# Patient Record
Sex: FEMALE | Race: WHITE | NOT HISPANIC OR LATINO | Employment: OTHER | URBAN - METROPOLITAN AREA
[De-identification: names, ages, dates, MRNs, and addresses within clinical notes are randomized per-mention and may not be internally consistent; named-entity substitution may affect disease eponyms.]

---

## 2017-04-17 ENCOUNTER — OFFICE VISIT - HEALTHEAST (OUTPATIENT)
Dept: INTERNAL MEDICINE | Facility: CLINIC | Age: 67
End: 2017-04-17

## 2017-04-17 DIAGNOSIS — J32.9 SINUSITIS: ICD-10-CM

## 2017-04-17 DIAGNOSIS — L71.9 ROSACEA: ICD-10-CM

## 2017-04-17 DIAGNOSIS — F33.0 MAJOR DEPRESSIVE DISORDER, RECURRENT EPISODE, MILD (H): ICD-10-CM

## 2017-04-17 DIAGNOSIS — J06.9 URI (UPPER RESPIRATORY INFECTION): ICD-10-CM

## 2017-04-17 ASSESSMENT — MIFFLIN-ST. JEOR: SCORE: 1247.77

## 2017-04-18 ENCOUNTER — COMMUNICATION - HEALTHEAST (OUTPATIENT)
Dept: SCHEDULING | Facility: CLINIC | Age: 67
End: 2017-04-18

## 2017-04-19 ENCOUNTER — COMMUNICATION - HEALTHEAST (OUTPATIENT)
Dept: SCHEDULING | Facility: CLINIC | Age: 67
End: 2017-04-19

## 2017-04-21 ENCOUNTER — COMMUNICATION - HEALTHEAST (OUTPATIENT)
Dept: SCHEDULING | Facility: CLINIC | Age: 67
End: 2017-04-21

## 2017-04-21 DIAGNOSIS — B99.9 INFECTION: ICD-10-CM

## 2017-04-30 ENCOUNTER — COMMUNICATION - HEALTHEAST (OUTPATIENT)
Dept: INTERNAL MEDICINE | Facility: CLINIC | Age: 67
End: 2017-04-30

## 2017-05-03 ENCOUNTER — COMMUNICATION - HEALTHEAST (OUTPATIENT)
Dept: SCHEDULING | Facility: CLINIC | Age: 67
End: 2017-05-03

## 2017-05-03 ENCOUNTER — COMMUNICATION - HEALTHEAST (OUTPATIENT)
Dept: INTERNAL MEDICINE | Facility: CLINIC | Age: 67
End: 2017-05-03

## 2017-05-03 DIAGNOSIS — B99.9 INFECTION: ICD-10-CM

## 2017-05-17 ENCOUNTER — RADIANT APPOINTMENT (OUTPATIENT)
Dept: MAMMOGRAPHY | Facility: CLINIC | Age: 67
End: 2017-05-17
Attending: INTERNAL MEDICINE

## 2017-05-17 ENCOUNTER — RECORDS - HEALTHEAST (OUTPATIENT)
Dept: ADMINISTRATIVE | Facility: OTHER | Age: 67
End: 2017-05-17

## 2017-05-17 DIAGNOSIS — Z80.3 FAMILY HISTORY OF BREAST CANCER: ICD-10-CM

## 2017-05-17 DIAGNOSIS — N60.19 FIBROCYSTIC BREAST CHANGES, UNSPECIFIED LATERALITY: ICD-10-CM

## 2017-05-18 ENCOUNTER — OFFICE VISIT - HEALTHEAST (OUTPATIENT)
Dept: INTERNAL MEDICINE | Facility: CLINIC | Age: 67
End: 2017-05-18

## 2017-05-18 ENCOUNTER — HOSPITAL ENCOUNTER (OUTPATIENT)
Dept: LAB | Age: 67
Setting detail: SPECIMEN
Discharge: HOME | End: 2017-05-18

## 2017-05-18 DIAGNOSIS — R09.81 SINUS CONGESTION: ICD-10-CM

## 2017-05-18 DIAGNOSIS — R13.10 PAIN WITH SWALLOWING: ICD-10-CM

## 2017-05-18 DIAGNOSIS — J02.9 SORE THROAT: ICD-10-CM

## 2017-05-18 ASSESSMENT — MIFFLIN-ST. JEOR: SCORE: 1266.76

## 2017-05-25 ENCOUNTER — COMMUNICATION - HEALTHEAST (OUTPATIENT)
Dept: INTERNAL MEDICINE | Facility: CLINIC | Age: 67
End: 2017-05-25

## 2017-06-01 ENCOUNTER — RECORDS - HEALTHEAST (OUTPATIENT)
Dept: ADMINISTRATIVE | Facility: OTHER | Age: 67
End: 2017-06-01

## 2017-07-13 ENCOUNTER — COMMUNICATION - HEALTHEAST (OUTPATIENT)
Dept: INTERNAL MEDICINE | Facility: CLINIC | Age: 67
End: 2017-07-13

## 2017-08-26 ENCOUNTER — HEALTH MAINTENANCE LETTER (OUTPATIENT)
Age: 67
End: 2017-08-26

## 2017-12-16 ENCOUNTER — COMMUNICATION - HEALTHEAST (OUTPATIENT)
Dept: INTERNAL MEDICINE | Facility: CLINIC | Age: 67
End: 2017-12-16

## 2017-12-17 ENCOUNTER — COMMUNICATION - HEALTHEAST (OUTPATIENT)
Dept: INTERNAL MEDICINE | Facility: CLINIC | Age: 67
End: 2017-12-17

## 2017-12-18 ENCOUNTER — COMMUNICATION - HEALTHEAST (OUTPATIENT)
Dept: INTERNAL MEDICINE | Facility: CLINIC | Age: 67
End: 2017-12-18

## 2017-12-25 ENCOUNTER — COMMUNICATION - HEALTHEAST (OUTPATIENT)
Dept: INTERNAL MEDICINE | Facility: CLINIC | Age: 67
End: 2017-12-25

## 2017-12-26 ENCOUNTER — RECORDS - HEALTHEAST (OUTPATIENT)
Dept: ADMINISTRATIVE | Facility: OTHER | Age: 67
End: 2017-12-26

## 2017-12-26 ENCOUNTER — COMMUNICATION - HEALTHEAST (OUTPATIENT)
Dept: INTERNAL MEDICINE | Facility: CLINIC | Age: 67
End: 2017-12-26

## 2018-03-01 ENCOUNTER — HOSPITAL ENCOUNTER (OUTPATIENT)
Dept: LAB | Age: 68
Setting detail: SPECIMEN
Discharge: HOME | End: 2018-03-01

## 2018-03-01 ENCOUNTER — OFFICE VISIT - HEALTHEAST (OUTPATIENT)
Dept: INTERNAL MEDICINE | Facility: CLINIC | Age: 68
End: 2018-03-01

## 2018-03-01 ENCOUNTER — OFFICE VISIT - HEALTHEAST (OUTPATIENT)
Dept: PODIATRY | Facility: CLINIC | Age: 68
End: 2018-03-01

## 2018-03-01 DIAGNOSIS — L60.2 ONYCHAUXIS: ICD-10-CM

## 2018-03-01 DIAGNOSIS — R63.5 WEIGHT GAIN: ICD-10-CM

## 2018-03-01 DIAGNOSIS — L71.9 ROSACEA: ICD-10-CM

## 2018-03-01 DIAGNOSIS — Z23 NEED FOR VACCINATION: ICD-10-CM

## 2018-03-01 DIAGNOSIS — E55.9 VITAMIN D DEFICIENCY: ICD-10-CM

## 2018-03-01 DIAGNOSIS — F33.0 MAJOR DEPRESSIVE DISORDER, RECURRENT EPISODE, MILD (H): ICD-10-CM

## 2018-03-01 LAB — TSH SERPL DL<=0.005 MIU/L-ACNC: 1.85 UIU/ML (ref 0.3–5)

## 2018-03-02 LAB — 25(OH)D3 SERPL-MCNC: 35.3 NG/ML (ref 30–80)

## 2018-03-04 ENCOUNTER — COMMUNICATION - HEALTHEAST (OUTPATIENT)
Dept: INTERNAL MEDICINE | Facility: CLINIC | Age: 68
End: 2018-03-04

## 2018-03-06 ENCOUNTER — RECORDS - HEALTHEAST (OUTPATIENT)
Dept: ADMINISTRATIVE | Facility: OTHER | Age: 68
End: 2018-03-06

## 2018-05-14 ENCOUNTER — OFFICE VISIT - HEALTHEAST (OUTPATIENT)
Dept: INTERNAL MEDICINE | Facility: CLINIC | Age: 68
End: 2018-05-14

## 2018-05-14 DIAGNOSIS — Z12.31 ENCOUNTER FOR SCREENING MAMMOGRAM FOR BREAST CANCER: ICD-10-CM

## 2018-05-14 DIAGNOSIS — Z80.3 FAMILY HISTORY OF BREAST CANCER: ICD-10-CM

## 2018-05-14 DIAGNOSIS — S99.929A INJURY OF NAIL BED OF TOE: ICD-10-CM

## 2018-05-14 ASSESSMENT — MIFFLIN-ST. JEOR: SCORE: 1320.06

## 2018-05-23 ENCOUNTER — OFFICE VISIT (OUTPATIENT)
Dept: PODIATRY | Facility: CLINIC | Age: 68
End: 2018-05-23
Payer: MEDICARE

## 2018-05-23 ENCOUNTER — RECORDS - HEALTHEAST (OUTPATIENT)
Dept: ADMINISTRATIVE | Facility: OTHER | Age: 68
End: 2018-05-23

## 2018-05-23 VITALS
SYSTOLIC BLOOD PRESSURE: 136 MMHG | HEART RATE: 85 BPM | OXYGEN SATURATION: 97 % | BODY MASS INDEX: 26.37 KG/M2 | DIASTOLIC BLOOD PRESSURE: 82 MMHG | WEIGHT: 168 LBS | HEIGHT: 67 IN

## 2018-05-23 DIAGNOSIS — L60.2 LONG TOENAIL: Primary | ICD-10-CM

## 2018-05-23 PROCEDURE — 99202 OFFICE O/P NEW SF 15 MIN: CPT | Performed by: PODIATRIST

## 2018-05-23 ASSESSMENT — PAIN SCALES - GENERAL: PAINLEVEL: NO PAIN (0)

## 2018-05-23 NOTE — MR AVS SNAPSHOT
"              After Visit Summary   5/23/2018    Verónica Horn    MRN: [id number]           Patient Information     Date Of Birth          1950        Visit Information        Provider Department      5/23/2018 12:40 PM Fermin Alvarez DPM [facility]        Today's Diagnoses     Long toenail    -  1       Follow-ups after your visit        Your next 10 appointments already scheduled     May 25, 2018 11:00 AM CDT   MA SCREENING BILATERAL W/ KAITLIN with [id number]   [facility] ([facility])    [address]  2nd Floor  [place] [place] [facility] [address] [phone]           Three-dimensional (3D) mammograms are available at [place] locations in [facility], [place], [facility], [place], [facility], and [place]. [facility] locations include [place] and [facility] in [place]. Benefits of 3D mammograms include: - Improved rate of cancer detection - Decreases your chance of having to go back for more tests, which means fewer: - \"False-positive\" results (This means that there is an abnormal area but it isn't cancer.) - Invasive testing procedures, such as a biopsy or surgery - Can provide clearer images of the breast if you have dense breast tissue. 3D mammography is an optional exam that anyone can have with a 2D mammogram. It doesn't replace or take the place of a 2D mammogram. 2D mammograms remain an effective screening test for all women.  Not all insurance companies cover the cost of a 3D mammogram. Check with your insurance.              Who to contact     If you have questions or need follow up information about today's clinic visit or your schedule please contact [facility] directly at [phone].  Normal or non-critical lab and imaging results will be communicated to you by MyChart, letter or phone within 4 business days after the clinic has received the results. If you do not " "hear from us within 7 days, please contact the clinic through [email or web address] or phone. If you have a critical or abnormal lab result, we will notify you by phone as soon as possible.  Submit refill requests through [email or web address] or call your pharmacy and they will forward the refill request to us. Please allow 3 business days for your refill to be completed.          Additional Information About Your Visit        [employer] Information     [email or web address] is an electronic gateway that provides easy, online access to your medical records. With [email or web address], you can request a clinic appointment, read your test results, renew a prescription or communicate with your care team.     To sign up for [email or web address] visit the website at [email or web address]   You will be asked to enter the access code listed below, as well as some personal information. Please follow the directions to create your username and password.     Your access code is: [email or web address]  Expires: 2018  6:30 AM     Your access code will  in 90 days. If you need help or a new code, please contact your [facility] or call [phone] for assistance.        Care EveryWhere ID     This is your Care EveryWhere ID. This could be used by other organizations to access your [place] medical records  [id number]        Your Vitals Were     Pulse Height Pulse Oximetry BMI (Body Mass Index)          85 1.689 m (5' 6.5\") 97% 26.71 kg/m2         Blood Pressure from Last 3 Encounters:   18 136/82   07 108/68   05 94/64    Weight from Last 3 Encounters:   18 76.2 kg (168 lb)   07 69.4 kg (153 lb)   05 67.6 kg (149 lb)              Today, you had the following     No orders found for display       Primary Care Provider Office Phone # Fax #    Angelina Evans [phone]       [facility] [address] [facility] [address]        Equal Access to Services     MIKE ENCARNACION AH: Hadii aad ku " heber Moise, wabraedenda luqadaha, qatavonta kakaleighda linda, beth meimarga nicolette. So [facility] [phone].    ATENCIÓN: Si habla español, tiene a adams disposición servicios gratuitos de asistencia lingüística. Teja al [phone].    We comply with applicable federal civil rights laws and [place] laws. We do not discriminate on the basis of race, color, national origin, age, disability, sex, sexual orientation, or gender identity.            Thank you!     Thank you for choosing [facility]  for your care. Our goal is always to provide you with excellent care. Hearing back from our patients is one way we can continue to improve our services. Please take a few minutes to complete the written survey that you may receive in the mail after your visit with us. Thank you!             Your Updated Medication List - Protect others around you: Learn how to safely use, store and throw away your medicines at www.disposemymeds.org.          This list is accurate as of 5/23/18 11:59 PM.  Always use your most recent med list.                   Brand Name Dispense Instructions for use Diagnosis    BONIVA 150 MG tablet   Generic drug:  IBANdronate     .9    1 TABLET MONTHLY    Routine gynecological examination, Disorder of bone and cartilage, unspecified       ALEYDA-MAG PO      2 tablets daily        KLONOPIN PO      PRN        MULTIVITAMIN PO      1 tablet

## 2018-05-23 NOTE — NURSING NOTE
"Verónica Horn's chief complaint for this visit includes:  Chief Complaint   Patient presents with     Consult     Left 1st toe pain     PCP: Angelina Evans    Referring Provider:  Referred Self, MD  No address on file    /82  Pulse 85  Ht 1.689 m (5' 6.5\")  Wt 76.2 kg (168 lb)  SpO2 97%  BMI 26.71 kg/m2 No Pain (0)     Do you need any medication refills at today's visit? no      "
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
## 2018-05-25 ENCOUNTER — RECORDS - HEALTHEAST (OUTPATIENT)
Dept: ADMINISTRATIVE | Facility: OTHER | Age: 68
End: 2018-05-25

## 2018-05-25 ENCOUNTER — RADIANT APPOINTMENT (OUTPATIENT)
Dept: MAMMOGRAPHY | Facility: CLINIC | Age: 68
End: 2018-05-25
Payer: MEDICARE

## 2018-05-25 DIAGNOSIS — Z12.31 VISIT FOR SCREENING MAMMOGRAM: ICD-10-CM

## 2018-05-31 ENCOUNTER — MEDICAL CORRESPONDENCE (OUTPATIENT)
Dept: HEALTH INFORMATION MANAGEMENT | Facility: CLINIC | Age: 68
End: 2018-05-31

## 2018-05-31 ENCOUNTER — COMMUNICATION - HEALTHEAST (OUTPATIENT)
Dept: INTERNAL MEDICINE | Facility: CLINIC | Age: 68
End: 2018-05-31

## 2018-06-06 ENCOUNTER — COMMUNICATION - HEALTHEAST (OUTPATIENT)
Dept: INTERNAL MEDICINE | Facility: CLINIC | Age: 68
End: 2018-06-06

## 2018-06-08 ENCOUNTER — RECORDS - HEALTHEAST (OUTPATIENT)
Dept: ADMINISTRATIVE | Facility: OTHER | Age: 68
End: 2018-06-08

## 2018-06-08 ENCOUNTER — RADIANT APPOINTMENT (OUTPATIENT)
Dept: MAMMOGRAPHY | Facility: CLINIC | Age: 68
End: 2018-06-08
Attending: INTERNAL MEDICINE
Payer: MEDICARE

## 2018-06-08 DIAGNOSIS — R92.8 ABNORMAL MAMMOGRAM OF RIGHT BREAST: ICD-10-CM

## 2018-07-05 ENCOUNTER — COMMUNICATION - HEALTHEAST (OUTPATIENT)
Dept: INTERNAL MEDICINE | Facility: CLINIC | Age: 68
End: 2018-07-05

## 2018-07-05 DIAGNOSIS — R92.8 ABNORMAL MAMMOGRAM: ICD-10-CM

## 2018-08-28 ENCOUNTER — OFFICE VISIT - HEALTHEAST (OUTPATIENT)
Dept: INTERNAL MEDICINE | Facility: CLINIC | Age: 68
End: 2018-08-28

## 2018-08-28 ENCOUNTER — COMMUNICATION - HEALTHEAST (OUTPATIENT)
Dept: SCHEDULING | Facility: CLINIC | Age: 68
End: 2018-08-28

## 2018-08-28 ENCOUNTER — COMMUNICATION - HEALTHEAST (OUTPATIENT)
Dept: INTERNAL MEDICINE | Facility: CLINIC | Age: 68
End: 2018-08-28

## 2018-08-28 DIAGNOSIS — R07.89 CHEST WALL PAIN: ICD-10-CM

## 2018-08-29 ENCOUNTER — OFFICE VISIT - HEALTHEAST (OUTPATIENT)
Dept: INTERNAL MEDICINE | Facility: CLINIC | Age: 68
End: 2018-08-29

## 2018-08-29 DIAGNOSIS — M54.2 CERVICALGIA: ICD-10-CM

## 2018-08-29 DIAGNOSIS — M54.6 ACUTE LEFT-SIDED THORACIC BACK PAIN: ICD-10-CM

## 2018-10-04 ENCOUNTER — COMMUNICATION - HEALTHEAST (OUTPATIENT)
Dept: INTERNAL MEDICINE | Facility: CLINIC | Age: 68
End: 2018-10-04

## 2018-10-23 ENCOUNTER — COMMUNICATION - HEALTHEAST (OUTPATIENT)
Dept: SCHEDULING | Facility: CLINIC | Age: 68
End: 2018-10-23

## 2018-10-25 ENCOUNTER — OFFICE VISIT - HEALTHEAST (OUTPATIENT)
Dept: INTERNAL MEDICINE | Facility: CLINIC | Age: 68
End: 2018-10-25

## 2018-10-25 DIAGNOSIS — M99.02 SOMATIC DYSFUNCTION OF SPINE, THORACIC: ICD-10-CM
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
## 2018-10-25 DIAGNOSIS — M99.01 SOMATIC DYSFUNCTION OF CERVICAL REGION: ICD-10-CM

## 2018-10-25 DIAGNOSIS — M99.07 SOMATIC DYSFUNCTION OF SPINE AFFECTING UPPER EXTREMITY: ICD-10-CM

## 2018-10-25 ASSESSMENT — MIFFLIN-ST. JEOR: SCORE: 1326.01

## 2018-11-15 ENCOUNTER — OFFICE VISIT - HEALTHEAST (OUTPATIENT)
Dept: INTERNAL MEDICINE | Facility: CLINIC | Age: 68
End: 2018-11-15
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
## 2018-11-15 DIAGNOSIS — G43.809 OTHER MIGRAINE WITHOUT STATUS MIGRAINOSUS, NOT INTRACTABLE: ICD-10-CM

## 2018-11-15 ASSESSMENT — MIFFLIN-ST. JEOR: SCORE: 1318.76

## 2018-11-20 ENCOUNTER — COMMUNICATION - HEALTHEAST (OUTPATIENT)
Dept: INTERNAL MEDICINE | Facility: CLINIC | Age: 68
End: 2018-11-20

## 2018-11-20 DIAGNOSIS — M25.531 PAIN IN BOTH WRISTS: ICD-10-CM

## 2018-11-20 DIAGNOSIS — M25.532 PAIN IN BOTH WRISTS: ICD-10-CM

## 2018-11-20 DIAGNOSIS — M19.049 HAND ARTHRITIS: ICD-10-CM

## 2018-11-27 ENCOUNTER — COMMUNICATION - HEALTHEAST (OUTPATIENT)
Dept: INTERNAL MEDICINE | Facility: CLINIC | Age: 68
End: 2018-11-27

## 2018-11-27 DIAGNOSIS — L71.9 ROSACEA: ICD-10-CM

## 2019-06-26 ENCOUNTER — OFFICE VISIT - HEALTHEAST (OUTPATIENT)
Dept: INTERNAL MEDICINE | Facility: CLINIC | Age: 69
End: 2019-06-26

## 2019-06-26 DIAGNOSIS — E78.5 HYPERLIPIDEMIA LDL GOAL <130: ICD-10-CM

## 2019-06-26 DIAGNOSIS — N64.89 OCCLUSION OF BREAST DUCT: ICD-10-CM

## 2019-06-26 DIAGNOSIS — R53.83 FATIGUE, UNSPECIFIED TYPE: ICD-10-CM

## 2019-06-26 DIAGNOSIS — Z00.00 ROUTINE GENERAL MEDICAL EXAMINATION AT A HEALTH CARE FACILITY: ICD-10-CM

## 2019-06-26 DIAGNOSIS — Z12.31 ENCOUNTER FOR SCREENING MAMMOGRAM FOR BREAST CANCER: ICD-10-CM

## 2019-06-26 DIAGNOSIS — Z78.0 POST-MENOPAUSAL: ICD-10-CM

## 2019-06-26 DIAGNOSIS — M81.0 SENILE OSTEOPOROSIS: ICD-10-CM

## 2019-06-26 ASSESSMENT — MIFFLIN-ST. JEOR: SCORE: 1304.17

## 2019-06-27 ENCOUNTER — COMMUNICATION - HEALTHEAST (OUTPATIENT)
Dept: INTERNAL MEDICINE | Facility: CLINIC | Age: 69
End: 2019-06-27

## 2019-06-27 DIAGNOSIS — K21.9 GASTROESOPHAGEAL REFLUX DISEASE WITHOUT ESOPHAGITIS: ICD-10-CM

## 2019-06-28 ENCOUNTER — MEDICAL CORRESPONDENCE (OUTPATIENT)
Dept: HEALTH INFORMATION MANAGEMENT | Facility: CLINIC | Age: 69
End: 2019-06-28

## 2019-07-05 ENCOUNTER — ANCILLARY PROCEDURE (OUTPATIENT)
Dept: MAMMOGRAPHY | Facility: CLINIC | Age: 69
End: 2019-07-05
Attending: INTERNAL MEDICINE
Payer: MEDICARE

## 2019-07-05 DIAGNOSIS — N64.4 BREAST PAIN, RIGHT: ICD-10-CM

## 2019-07-08 ENCOUNTER — COMMUNICATION - HEALTHEAST (OUTPATIENT)
Dept: INTERNAL MEDICINE | Facility: CLINIC | Age: 69
End: 2019-07-08

## 2019-07-08 DIAGNOSIS — L98.9 SKIN LESION: ICD-10-CM

## 2019-07-10 ENCOUNTER — COMMUNICATION - HEALTHEAST (OUTPATIENT)
Dept: INTERNAL MEDICINE | Facility: CLINIC | Age: 69
End: 2019-07-10

## 2019-07-10 DIAGNOSIS — N61.1 BREAST ABSCESS: ICD-10-CM

## 2019-07-12 ENCOUNTER — RECORDS - HEALTHEAST (OUTPATIENT)
Dept: RADIOLOGY | Facility: CLINIC | Age: 69
End: 2019-07-12

## 2019-07-12 ENCOUNTER — RECORDS - HEALTHEAST (OUTPATIENT)
Dept: ADMINISTRATIVE | Facility: OTHER | Age: 69
End: 2019-07-12

## 2019-07-15 ENCOUNTER — HOSPITAL ENCOUNTER (OUTPATIENT)
Dept: SURGERY | Facility: CLINIC | Age: 69
Discharge: HOME | End: 2019-07-15
Attending: INTERNAL MEDICINE | Admitting: SURGERY

## 2019-07-15 ENCOUNTER — AMBULATORY - HEALTHEAST (OUTPATIENT)
Dept: ONCOLOGY | Facility: CLINIC | Age: 69
End: 2019-07-15

## 2019-07-15 DIAGNOSIS — Q83.9 NIPPLE ANOMALY: ICD-10-CM

## 2019-08-21 ENCOUNTER — RECORDS - HEALTHEAST (OUTPATIENT)
Dept: ADMINISTRATIVE | Facility: OTHER | Age: 69
End: 2019-08-21

## 2019-09-11 ENCOUNTER — RECORDS - HEALTHEAST (OUTPATIENT)
Dept: BONE DENSITY | Facility: CLINIC | Age: 69
End: 2019-09-11

## 2019-09-11 DIAGNOSIS — Z78.0 ASYMPTOMATIC MENOPAUSAL STATE: ICD-10-CM

## 2019-09-18 ENCOUNTER — COMMUNICATION - HEALTHEAST (OUTPATIENT)
Dept: INTERNAL MEDICINE | Facility: CLINIC | Age: 69
End: 2019-09-18

## 2019-09-18 DIAGNOSIS — M19.049 HAND ARTHRITIS: ICD-10-CM

## 2019-09-27 ENCOUNTER — RECORDS - HEALTHEAST (OUTPATIENT)
Dept: ADMINISTRATIVE | Facility: OTHER | Age: 69
End: 2019-09-27

## 2019-10-01 ENCOUNTER — COMMUNICATION - HEALTHEAST (OUTPATIENT)
Dept: INTERNAL MEDICINE | Facility: CLINIC | Age: 69
End: 2019-10-01

## 2019-12-11 ENCOUNTER — COMMUNICATION - HEALTHEAST (OUTPATIENT)
Dept: INTERNAL MEDICINE | Facility: CLINIC | Age: 69
End: 2019-12-11

## 2019-12-11 DIAGNOSIS — F51.01 PRIMARY INSOMNIA: ICD-10-CM

## 2019-12-20 ENCOUNTER — OFFICE VISIT - HEALTHEAST (OUTPATIENT)
Dept: INTERNAL MEDICINE | Facility: CLINIC | Age: 69
End: 2019-12-20

## 2019-12-20 DIAGNOSIS — M81.0 AGE-RELATED OSTEOPOROSIS WITHOUT CURRENT PATHOLOGICAL FRACTURE: ICD-10-CM

## 2019-12-20 DIAGNOSIS — M81.0 SENILE OSTEOPOROSIS: ICD-10-CM

## 2019-12-20 DIAGNOSIS — N64.89 OCCLUSION OF BREAST DUCT: ICD-10-CM

## 2019-12-20 DIAGNOSIS — R53.83 FATIGUE, UNSPECIFIED TYPE: ICD-10-CM

## 2019-12-20 DIAGNOSIS — E78.5 HYPERLIPIDEMIA LDL GOAL <130: ICD-10-CM

## 2019-12-20 LAB
ALBUMIN SERPL-MCNC: 3.9 G/DL (ref 3.5–5)
ALP SERPL-CCNC: 70 U/L (ref 45–120)
ALT SERPL W P-5'-P-CCNC: 20 U/L (ref 0–45)
ANION GAP SERPL CALCULATED.3IONS-SCNC: 10 MMOL/L (ref 5–18)
AST SERPL W P-5'-P-CCNC: 23 U/L (ref 0–40)
BILIRUB SERPL-MCNC: 0.5 MG/DL (ref 0–1)
BUN SERPL-MCNC: 18 MG/DL (ref 8–22)
CALCIUM SERPL-MCNC: 9.8 MG/DL (ref 8.5–10.5)
CHLORIDE BLD-SCNC: 107 MMOL/L (ref 98–107)
CHOLEST SERPL-MCNC: 272 MG/DL
CO2 SERPL-SCNC: 25 MMOL/L (ref 22–31)
CREAT SERPL-MCNC: 0.83 MG/DL (ref 0.6–1.1)
ERYTHROCYTE [DISTWIDTH] IN BLOOD BY AUTOMATED COUNT: 10.2 % (ref 11–14.5)
FASTING STATUS PATIENT QL REPORTED: NO
GFR SERPL CREATININE-BSD FRML MDRD: >60 ML/MIN/1.73M2
GLUCOSE BLD-MCNC: 99 MG/DL (ref 70–125)
HCT VFR BLD AUTO: 47.7 % (ref 35–47)
HDLC SERPL-MCNC: 77 MG/DL
HGB BLD-MCNC: 15.7 G/DL (ref 12–16)
LDLC SERPL CALC-MCNC: 155 MG/DL
MCH RBC QN AUTO: 33.6 PG (ref 27–34)
MCHC RBC AUTO-ENTMCNC: 32.9 G/DL (ref 32–36)
MCV RBC AUTO: 102 FL (ref 80–100)
PLATELET # BLD AUTO: 329 THOU/UL (ref 140–440)
PMV BLD AUTO: 6.7 FL (ref 7–10)
POTASSIUM BLD-SCNC: 4 MMOL/L (ref 3.5–5)
PROT SERPL-MCNC: 7.2 G/DL (ref 6–8)
PTH-INTACT SERPL-MCNC: 31 PG/ML (ref 10–86)
RBC # BLD AUTO: 4.67 MILL/UL (ref 3.8–5.4)
SODIUM SERPL-SCNC: 142 MMOL/L (ref 136–145)
TRIGL SERPL-MCNC: 202 MG/DL
TSH SERPL DL<=0.005 MIU/L-ACNC: 2.13 UIU/ML (ref 0.3–5)
WBC: 6.2 THOU/UL (ref 4–11)

## 2019-12-20 ASSESSMENT — MIFFLIN-ST. JEOR: SCORE: 1322.34

## 2019-12-22 LAB — 25(OH)D3 SERPL-MCNC: 37.6 NG/ML (ref 30–80)

## 2019-12-23 ENCOUNTER — COMMUNICATION - HEALTHEAST (OUTPATIENT)
Dept: INTERNAL MEDICINE | Facility: CLINIC | Age: 69
End: 2019-12-23

## 2019-12-23 LAB
GLIADIN IGA SER-ACNC: 1.2 U/ML
GLIADIN IGG SER-ACNC: <0.4 U/ML
IGA SERPL-MCNC: 257 MG/DL (ref 65–400)
TTG IGA SER-ACNC: 0.3 U/ML
TTG IGG SER-ACNC: <0.6 U/ML

## 2020-01-23 ENCOUNTER — COMMUNICATION - HEALTHEAST (OUTPATIENT)
Dept: SCHEDULING | Facility: CLINIC | Age: 70
End: 2020-01-23

## 2020-01-23 ENCOUNTER — OFFICE VISIT - HEALTHEAST (OUTPATIENT)
Dept: INTERNAL MEDICINE | Facility: CLINIC | Age: 70
End: 2020-01-23

## 2020-01-23 DIAGNOSIS — R09.81 SINUS CONGESTION: ICD-10-CM

## 2020-01-23 DIAGNOSIS — J40 BRONCHITIS: ICD-10-CM

## 2020-01-23 DIAGNOSIS — R05.9 COUGH: ICD-10-CM

## 2020-01-23 LAB
FLUAV AG SPEC QL IA: NORMAL
FLUBV AG SPEC QL IA: NORMAL

## 2020-04-04 ENCOUNTER — COMMUNICATION - HEALTHEAST (OUTPATIENT)
Dept: INTERNAL MEDICINE | Facility: CLINIC | Age: 70
End: 2020-04-04

## 2020-04-04 DIAGNOSIS — L71.9 ROSACEA: ICD-10-CM

## 2020-04-16 ENCOUNTER — COMMUNICATION - HEALTHEAST (OUTPATIENT)
Dept: INTERNAL MEDICINE | Facility: CLINIC | Age: 70
End: 2020-04-16

## 2020-11-11 ENCOUNTER — RECORDS - HEALTHEAST (OUTPATIENT)
Dept: ADMINISTRATIVE | Facility: OTHER | Age: 70
End: 2020-11-11

## 2020-11-19 ENCOUNTER — RECORDS - HEALTHEAST (OUTPATIENT)
Dept: ADMINISTRATIVE | Facility: OTHER | Age: 70
End: 2020-11-19

## 2020-12-04 ENCOUNTER — OFFICE VISIT - HEALTHEAST (OUTPATIENT)
Dept: INTERNAL MEDICINE | Facility: CLINIC | Age: 70
End: 2020-12-04

## 2020-12-04 DIAGNOSIS — Z00.00 ROUTINE GENERAL MEDICAL EXAMINATION AT A HEALTH CARE FACILITY: ICD-10-CM

## 2020-12-04 DIAGNOSIS — Z12.31 ENCOUNTER FOR SCREENING MAMMOGRAM FOR BREAST CANCER: ICD-10-CM

## 2020-12-04 DIAGNOSIS — E55.9 VITAMIN D DEFICIENCY: ICD-10-CM

## 2020-12-04 DIAGNOSIS — Z11.59 ENCOUNTER FOR HEPATITIS C SCREENING TEST FOR LOW RISK PATIENT: ICD-10-CM

## 2020-12-04 DIAGNOSIS — M81.0 AGE-RELATED OSTEOPOROSIS WITHOUT CURRENT PATHOLOGICAL FRACTURE: ICD-10-CM

## 2020-12-04 DIAGNOSIS — E78.5 HYPERLIPIDEMIA LDL GOAL <130: ICD-10-CM

## 2020-12-04 LAB
ATRIAL RATE - MUSE: 78 BPM
DIASTOLIC BLOOD PRESSURE - MUSE: NORMAL
INTERPRETATION ECG - MUSE: NORMAL
P AXIS - MUSE: 71 DEGREES
PR INTERVAL - MUSE: 148 MS
QRS DURATION - MUSE: 134 MS
QT - MUSE: 424 MS
QTC - MUSE: 483 MS
R AXIS - MUSE: 20 DEGREES
SYSTOLIC BLOOD PRESSURE - MUSE: NORMAL
T AXIS - MUSE: 200 DEGREES
VENTRICULAR RATE- MUSE: 78 BPM

## 2020-12-04 ASSESSMENT — PATIENT HEALTH QUESTIONNAIRE - PHQ9: SUM OF ALL RESPONSES TO PHQ QUESTIONS 1-9: 0

## 2020-12-04 ASSESSMENT — MIFFLIN-ST. JEOR: SCORE: 1354.09

## 2020-12-08 ENCOUNTER — COMMUNICATION - HEALTHEAST (OUTPATIENT)
Dept: INTERNAL MEDICINE | Facility: CLINIC | Age: 70
End: 2020-12-08

## 2020-12-08 DIAGNOSIS — F51.01 PRIMARY INSOMNIA: ICD-10-CM

## 2020-12-18 ENCOUNTER — AMBULATORY - HEALTHEAST (OUTPATIENT)
Dept: LAB | Facility: CLINIC | Age: 70
End: 2020-12-18

## 2020-12-18 DIAGNOSIS — E55.9 VITAMIN D DEFICIENCY: ICD-10-CM

## 2020-12-18 DIAGNOSIS — Z11.59 ENCOUNTER FOR HEPATITIS C SCREENING TEST FOR LOW RISK PATIENT: ICD-10-CM

## 2020-12-18 DIAGNOSIS — Z00.00 ROUTINE GENERAL MEDICAL EXAMINATION AT A HEALTH CARE FACILITY: ICD-10-CM

## 2020-12-18 DIAGNOSIS — E78.5 HYPERLIPIDEMIA LDL GOAL <130: ICD-10-CM

## 2020-12-18 LAB
ALBUMIN SERPL-MCNC: 4.3 G/DL (ref 3.5–5)
ALP SERPL-CCNC: 83 U/L (ref 45–120)
ALT SERPL W P-5'-P-CCNC: 17 U/L (ref 0–45)
ANION GAP SERPL CALCULATED.3IONS-SCNC: 13 MMOL/L (ref 5–18)
AST SERPL W P-5'-P-CCNC: 24 U/L (ref 0–40)
BASOPHILS # BLD AUTO: 0.1 THOU/UL (ref 0–0.2)
BASOPHILS NFR BLD AUTO: 1 % (ref 0–2)
BILIRUB SERPL-MCNC: 0.9 MG/DL (ref 0–1)
BUN SERPL-MCNC: 10 MG/DL (ref 8–22)
CALCIUM SERPL-MCNC: 9.4 MG/DL (ref 8.5–10.5)
CHLORIDE BLD-SCNC: 104 MMOL/L (ref 98–107)
CHOLEST SERPL-MCNC: 278 MG/DL
CO2 SERPL-SCNC: 24 MMOL/L (ref 22–31)
CREAT SERPL-MCNC: 0.86 MG/DL (ref 0.6–1.1)
EOSINOPHIL # BLD AUTO: 0.1 THOU/UL (ref 0–0.4)
EOSINOPHIL NFR BLD AUTO: 2 % (ref 0–6)
ERYTHROCYTE [DISTWIDTH] IN BLOOD BY AUTOMATED COUNT: 12.6 % (ref 11–14.5)
FASTING STATUS PATIENT QL REPORTED: YES
GFR SERPL CREATININE-BSD FRML MDRD: >60 ML/MIN/1.73M2
GLUCOSE BLD-MCNC: 103 MG/DL (ref 70–125)
HCT VFR BLD AUTO: 48.5 % (ref 35–47)
HDLC SERPL-MCNC: 76 MG/DL
HGB BLD-MCNC: 16.1 G/DL (ref 12–16)
IMM GRANULOCYTES # BLD: 0 THOU/UL
IMM GRANULOCYTES NFR BLD: 0 %
LDLC SERPL CALC-MCNC: 184 MG/DL
LYMPHOCYTES # BLD AUTO: 2 THOU/UL (ref 0.8–4.4)
LYMPHOCYTES NFR BLD AUTO: 35 % (ref 20–40)
MCH RBC QN AUTO: 32.5 PG (ref 27–34)
MCHC RBC AUTO-ENTMCNC: 33.2 G/DL (ref 32–36)
MCV RBC AUTO: 98 FL (ref 80–100)
MONOCYTES # BLD AUTO: 0.5 THOU/UL (ref 0–0.9)
MONOCYTES NFR BLD AUTO: 8 % (ref 2–10)
NEUTROPHILS # BLD AUTO: 3.1 THOU/UL (ref 2–7.7)
NEUTROPHILS NFR BLD AUTO: 54 % (ref 50–70)
PLATELET # BLD AUTO: 309 THOU/UL (ref 140–440)
PMV BLD AUTO: 9.4 FL (ref 8.5–12.5)
POTASSIUM BLD-SCNC: 4.1 MMOL/L (ref 3.5–5)
PROT SERPL-MCNC: 7.4 G/DL (ref 6–8)
RBC # BLD AUTO: 4.96 MILL/UL (ref 3.8–5.4)
SODIUM SERPL-SCNC: 141 MMOL/L (ref 136–145)
TRIGL SERPL-MCNC: 91 MG/DL
TSH SERPL DL<=0.005 MIU/L-ACNC: 4.06 UIU/ML (ref 0.3–5)
WBC: 5.7 THOU/UL (ref 4–11)

## 2020-12-21 LAB
25(OH)D3 SERPL-MCNC: 45.6 NG/ML (ref 30–80)
HCV AB SERPL QL IA: NEGATIVE

## 2020-12-22 ENCOUNTER — COMMUNICATION - HEALTHEAST (OUTPATIENT)
Dept: INTERNAL MEDICINE | Facility: CLINIC | Age: 70
End: 2020-12-22

## 2020-12-22 DIAGNOSIS — F51.01 PRIMARY INSOMNIA: ICD-10-CM

## 2021-01-04 ENCOUNTER — COMMUNICATION - HEALTHEAST (OUTPATIENT)
Dept: INTERNAL MEDICINE | Facility: CLINIC | Age: 71
End: 2021-01-04

## 2021-01-04 DIAGNOSIS — E78.5 HYPERLIPIDEMIA LDL GOAL <130: ICD-10-CM

## 2021-01-04 DIAGNOSIS — F51.01 PRIMARY INSOMNIA: ICD-10-CM

## 2021-01-14 ENCOUNTER — COMMUNICATION - HEALTHEAST (OUTPATIENT)
Dept: INTERNAL MEDICINE | Facility: CLINIC | Age: 71
End: 2021-01-14

## 2021-01-29 ENCOUNTER — COMMUNICATION - HEALTHEAST (OUTPATIENT)
Dept: INTERNAL MEDICINE | Facility: CLINIC | Age: 71
End: 2021-01-29

## 2021-02-09 ENCOUNTER — ANCILLARY PROCEDURE (OUTPATIENT)
Dept: MAMMOGRAPHY | Facility: CLINIC | Age: 71
End: 2021-02-09
Payer: COMMERCIAL

## 2021-02-09 DIAGNOSIS — Z12.31 VISIT FOR SCREENING MAMMOGRAM: ICD-10-CM

## 2021-02-09 PROCEDURE — 77067 SCR MAMMO BI INCL CAD: CPT | Mod: GC | Performed by: RADIOLOGY

## 2021-02-09 PROCEDURE — 77063 BREAST TOMOSYNTHESIS BI: CPT | Mod: GC | Performed by: RADIOLOGY

## 2021-02-14 ENCOUNTER — HEALTH MAINTENANCE LETTER (OUTPATIENT)
Age: 71
End: 2021-02-14

## 2021-02-19 ENCOUNTER — COMMUNICATION - HEALTHEAST (OUTPATIENT)
Dept: INTERNAL MEDICINE | Facility: CLINIC | Age: 71
End: 2021-02-19

## 2021-02-19 DIAGNOSIS — R09.81 SINUS CONGESTION: ICD-10-CM

## 2021-03-11 ENCOUNTER — OFFICE VISIT - HEALTHEAST (OUTPATIENT)
Dept: INTERNAL MEDICINE | Facility: CLINIC | Age: 71
End: 2021-03-11

## 2021-03-11 DIAGNOSIS — H01.001 BLEPHARITIS OF UPPER EYELIDS OF BOTH EYES, UNSPECIFIED TYPE: ICD-10-CM

## 2021-03-11 DIAGNOSIS — M81.0 AGE-RELATED OSTEOPOROSIS WITHOUT CURRENT PATHOLOGICAL FRACTURE: ICD-10-CM

## 2021-03-11 DIAGNOSIS — H01.004 BLEPHARITIS OF UPPER EYELIDS OF BOTH EYES, UNSPECIFIED TYPE: ICD-10-CM

## 2021-03-11 DIAGNOSIS — M79.645 PAIN OF FINGER OF LEFT HAND: ICD-10-CM

## 2021-05-07 ENCOUNTER — COMMUNICATION - HEALTHEAST (OUTPATIENT)
Dept: INTERNAL MEDICINE | Facility: CLINIC | Age: 71
End: 2021-05-07

## 2021-05-07 DIAGNOSIS — M81.0 SENILE OSTEOPOROSIS: ICD-10-CM

## 2021-05-26 ASSESSMENT — PATIENT HEALTH QUESTIONNAIRE - PHQ9: SUM OF ALL RESPONSES TO PHQ QUESTIONS 1-9: 0

## 2021-05-30 VITALS — HEIGHT: 67 IN | BODY MASS INDEX: 24.05 KG/M2 | WEIGHT: 153.25 LBS

## 2021-05-30 NOTE — PROGRESS NOTES
History:  This is a 69 y.o. female who I'm asked to see for evaluation of a right nipple lesion.  This was picked up by the patient about 2.5 weeks ago when she was in the shower.  It is on the tip of the nipple.  It has remained the same size since she noticed it.  It is slightly uncomfortable.  It has never drained.  She denies any erythema.  She is up-to-date on her breast imaging.    PAST MEDICAL HISTORY:  No past medical history on file.   Recently accidentally drank hydrogen peroxide.  Is taking Prilosec for this.  Fibromyalgia    PAST SURGICAL HISTORY:  Open cholecystectomy  Tonsillectomy    Current Outpatient Medications:      5-hydroxytryptophan 50 mg cap, Take 100 mg by mouth at bedtime. One tab in the morning and one tab in afternoon, Disp: , Rfl:      ammonium lactate (AMLACTIN) 12 % cream, Apply topically as needed for dry skin., Disp: 385 g, Rfl: 3     CALCIUM CARBONATE (CALCIUM 500 ORAL), Take 2,000 mg by mouth., Disp: , Rfl:      cholecalciferol, vitamin D3, (VITAMIN D3) 2,000 unit cap, Take by mouth., Disp: , Rfl:      fluticasone propionate (FLONASE) 50 mcg/actuation nasal spray, , Disp: , Rfl:      GREEN TEA LEAF EXTRACT (GREEN TEA ORAL), Take by mouth as needed. , Disp: , Rfl:      LOTEMAX 0.5 % DrpG, ADMINISTER 1 DROP TO BOTH EYES 2 (TWO) TIMES A DAY AS NEEDED., Disp: 5 g, Rfl: 0     metroNIDAZOLE (METROCREAM) 0.75 % cream, Apply twice a day, Disp: 45 g, Rfl: 3     omeprazole (PRILOSEC) 20 MG capsule, Take 1 capsule (20 mg total) by mouth daily before breakfast. Take daily for 3 weeksk, then start every other day for 1 week, then stop., Disp: 30 capsule, Rfl: 0     PROPYLENE GLYCOL/ (SYSTANE OPHT), Apply to eye as needed. OTC, Disp: , Rfl:      UNABLE TO FIND, Med Name: L-Theanine, Disp: , Rfl:     Allergies:  Allergies   Allergen Reactions     Cat Dander      Mite-Dermatophagoides Pteronyssinus, Standardized      Venom-Honey Bee        Social History:   reports that she has never smoked.  She has never used smokeless tobacco. She reports that she drinks alcohol. She reports that she does not use drugs.    Family History:  Mother had breast cancer in her 40s and  of it at 48.  Grandmother also had breast cancer.    ROS:  General: No complaints or constitutional symptoms  Skin: No complaints or symptoms   Hematologic/Lymphatic: No symptoms or complaints  Psychiatric: No symptoms or complaints  Endocrine: No excessive fatigue, no hypermetabolic symptoms reported  Respiratory: No cough, shortness of breath, or wheezing  Cardiovascular: No chest pain or dyspnea on exertion  Breast: Right nipple lesion.  Denies breast masses, nipple discharge, or nipple inversion.  Gastrointestinal: No abdominal pain, nausea, diarrhea, or constipation  Musculoskeletal: No recent injuries reported  Neurological: No focal neurologic defects reported.      PHYSICAL EXAM  There were no vitals taken for this visit.  General: Alert, cooperative, appears stated age   Skin: Skin color, texture, turgor normal, no rashes or lesions   Lymphatic: No obvious adenopathy, no swelling   Eyes: No scleral icterus, pupils equal  HENT: No traumatic injury to the head or face, no gross abnormalities  Lungs: Normal respiratory effort, breath sounds equal bilaterally  Heart: Regular rate and rhythm  Breasts: 2 mm white cyst at the 10 o'clock position within the nipple.  No palpable masses to either breast.  Abdomen: Soft, non-distended and non-tender to palpation  Neurologic: Grossly intact    IMAGING  Examinations: MA DIAGNOSTIC BILATERAL W/ KAITLIN, US BREAST RIGHT LIMITED  1-3 QUADRANTS, 2019 1:10 PM and CAD    Comparisons: 2018, 2018, 2017, 2011    History/Family History: Right nipple irritation. Family history for  breast cancer in mother at age 45; grandmother.    Breast Density: Scattered fibroglandular densities  Technique: Standard mammographic views were performed with  tomosynthesis and 2D  reconstruction.    Findings:     Mammogram:  There is been no significant interval change in either breast. There  is a biopsy marker in the right medial breast at middle depth.    Ultrasound:  Targeted, real-time ultrasound evaluation was performed by the  technologist and radiologist.  In the retroareolar region at 12:00, there is a stable cluster of  cysts. The patient indicates the area to be a white nodule at the tip  of her nipple. There is no abnormal blood flow of the nipple.    IMPRESSION: BI-RADS CATEGORY: 2 - Benign. The area of concern  corresponds to a skin lesion on the right nipple. No suspicious  mammographic or sonographic findings.    RECOMMENDED FOLLOW-UP: Annual Mammography. Clinical follow-up and  management of right nipple skin lesion. If it worsens, consultation  with dermatology can be considered.    PATHOLOGY  None    IMPRESSION:  Right nipple cyst.      PLAN:   Incision and drainage of this cyst was performed after consent.  The nipple areolar complex was prepped with Betadine.  1 cc of 1% lidocaine was injected into the nipple.  A 15 blade was then utilized to excise the epidermis and the underlying cyst with the sebaceous material.  Hemostasis was achieved with pressure and a Band-Aid was placed over the lesion.  The small lesion was not sent for pathology.    She can remove the Band-Aid later today.  Replace Band-Aid if there is any drainage at the site.  Return to the breast clinic as needed    Coby Cruz DO  [facility]  [phone]

## 2021-05-30 NOTE — TELEPHONE ENCOUNTER
Pt has small breast abscess that needs to be popped.Please help schedule appt for today or tomorrow with plastic surgery..  If non available with plastics, she could  see general surgeon ( Dr Cruz or her partner). I put referrals in.

## 2021-05-30 NOTE — TELEPHONE ENCOUNTER
"Who is calling:  The patient  Reason for Call:  The patient would like to have her referral for both the mammogram and the ultrasound faxed to the [facility]. The patient states that the phone number is [phone], she is unaware of the fax number. The patient states, \"This is mandatory.\"   Date of last appointment with primary care: 6/26/2019  Okay to leave a detailed message: Yes      "

## 2021-05-30 NOTE — TELEPHONE ENCOUNTER
Question following Office Visit  When did you see your provider: Yesterday  What is your question: She forgot to ask for the medication Dr. Evans talked about that would help heal the lining of her GI tract.  Please prescription to [facility].  Okay to leave a detailed message: No.  Patient is heard of hearing.

## 2021-05-30 NOTE — TELEPHONE ENCOUNTER
Who is calling:  Patient  Reason for Call:     Patient was told at [facility] ultrasound should follow-up with Dermatologist for the pimple in her right nipple.  Caller questioning if this can be taken care of in primary clinic, if not would like referral .    Patient's insurance denied payment for the PT patient received at [facility], for the rib cage pain. Inquiring as to how this order was put in, what diagnosis.  Needs clarification.  Date of last appointment with primary care: 06/26/19  Okay to leave a detailed message: No

## 2021-05-30 NOTE — TELEPHONE ENCOUNTER
Called and talked to pt and she is referring to for the [facility] bill for when Dr Dubose seen her on 8/29/18.  She is stating that she saw him for left breast and rib pain.  I did remind her that she also saw Dr Montoya as well and that is what she talked to her about. I advised her that Dr Dubose did order [facility] pt for acute left sided thoracic pain and back pain, postural strengening, neck and shoulder pain for land and aqua.    I offered to call [facility] for her to see what they order they got as well as what they actually did.  I called [facility] and they said gave me another phone number of [phone] and I had to leave a  for return phone call to discuss.

## 2021-05-30 NOTE — TELEPHONE ENCOUNTER
Per Derm - This patient is scheduled for August 21st at 1:50pm with Dr. Nerissa Manning at our [place] location. Thank you for the referral.
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
## 2021-05-30 NOTE — TELEPHONE ENCOUNTER
Patient Returning Call  Reason for call:  Message from clinic  Information relayed to patient: Angelina Evans MD         9:54 AM   Note      Pt has small breast abscess that needs to be popped.Please help schedule appt for today or tomorrow with plastic surgery..  If non available with plastics, she could  see general surgeon ( Dr Cruz or her partner). I put referrals in.       Patient has additional questions:  No  If YES, what are your questions/concerns:  Patient given phone number for Dr. Cruz  Okay to leave a detailed message?: No call back needed

## 2021-05-30 NOTE — TELEPHONE ENCOUNTER
Per MD requested, call patient and relay below message. Pt agreed with MD recommendation for plastic surgery.

## 2021-05-30 NOTE — TELEPHONE ENCOUNTER
Pt has appt with me on thursday for a breast pimple.   I did refer her to dermatology for this. Please ask her to see them. I do not do pimple/abscess drainage.

## 2021-05-30 NOTE — PROGRESS NOTES
"[facility] with Dr. Cruz.  Patient states she has had a white \"pimple\" on her right nipple for several weeks.  It has not changed at all in that time but does \"hurt\" at times.  Patient met with Dr. Cruz, see dictation for details and follow up plan for patient.    "
Clear bilaterally, pupils equal, round and reactive to light.

## 2021-05-31 VITALS — BODY MASS INDEX: 24.71 KG/M2 | HEIGHT: 67 IN | WEIGHT: 157.44 LBS

## 2021-06-01 VITALS — WEIGHT: 169.19 LBS | BODY MASS INDEX: 26.55 KG/M2 | HEIGHT: 67 IN

## 2021-06-01 VITALS — WEIGHT: 168.44 LBS | BODY MASS INDEX: 26.38 KG/M2

## 2021-06-01 NOTE — TELEPHONE ENCOUNTER
Referral Request  Type of referral: hand therapy with occupational therapist  Who s requesting: Patient  Why the request: For the last 4 years I have had a referral to [facility] with 1-2 visits per year for hand therapy.  I will be scheduling for hand surgery soon but need this therapy prior.    Have you been seen for this request: N/A Patient stated This is not needed  Does patient have a preference on a group/provider? Yes [facility] fax number is [phone] attn Hand Therapy Department    Okay to leave a detailed message?  Yes      Patient is requesting this to be done ASAP as she wants to be seen 9/27/19 on their earliest appointment. Patient also asks to have the wording on the referral to call patient ASAP to schedule this appointment once this referral is received.  The scheduling number for them is [phone].

## 2021-06-02 VITALS — BODY MASS INDEX: 26.76 KG/M2 | WEIGHT: 170.5 LBS | HEIGHT: 67 IN

## 2021-06-02 VITALS — WEIGHT: 171.56 LBS | BODY MASS INDEX: 27.28 KG/M2

## 2021-06-02 VITALS — WEIGHT: 168.9 LBS | HEIGHT: 67 IN | BODY MASS INDEX: 26.51 KG/M2

## 2021-06-03 VITALS — WEIGHT: 168 LBS | BODY MASS INDEX: 26.84 KG/M2

## 2021-06-03 VITALS — HEIGHT: 66 IN | WEIGHT: 168 LBS | BODY MASS INDEX: 27 KG/M2

## 2021-06-04 VITALS
WEIGHT: 168 LBS | OXYGEN SATURATION: 98 % | TEMPERATURE: 98.4 F | SYSTOLIC BLOOD PRESSURE: 126 MMHG | BODY MASS INDEX: 26.84 KG/M2 | DIASTOLIC BLOOD PRESSURE: 80 MMHG | HEART RATE: 80 BPM

## 2021-06-04 VITALS
DIASTOLIC BLOOD PRESSURE: 70 MMHG | SYSTOLIC BLOOD PRESSURE: 106 MMHG | HEART RATE: 72 BPM | WEIGHT: 172 LBS | HEIGHT: 66 IN | BODY MASS INDEX: 27.64 KG/M2 | OXYGEN SATURATION: 98 %

## 2021-06-04 NOTE — TELEPHONE ENCOUNTER
Controlled Substance Refill Request  Medication Name:   Requested Prescriptions     Pending Prescriptions Disp Refills     amitriptyline (ELAVIL) 10 MG tablet 60 tablet 2     Sig: TAKE 1 TO 2 TABLETS BY MOUTH AT BEDTIME AS NEEDED     Date Last Fill: 12/19/17  Pharmacy: [facility] - [place], [place] - [facility] [address]      Submit electronically to pharmacy  Controlled Substance Agreement Date Scanned:   Encounter-Level CSA Scan Date:    There are no encounter-level csa scan date.       Last office visit with prescriber/PCP: 6/26/2019 Angelina Evans MD OR same dept: 6/26/2019 Angelina Evans MD OR same specialty: 6/26/2019 Angelina Evans MD  Last physical: Visit date not found Last MTM visit: Visit date not found      Patient is going back to school and has insomnia. Requesting medication to manage changing lifestyle.  [facility] need to call phone twice with outcome to this request and send Rx to pharmacy.

## 2021-06-04 NOTE — PROGRESS NOTES
[facility] Follow Up Note    Assessment/Plan:    1. Age-related osteoporosis without current pathological fracture  This is not a new diagnosis but patient has resistant treatment in the past.  Currently she is taking super doses of vitamin D and calcium which may not be productive.  Since DEXA scan showed worsening bone density she is more interested in starting on medications.  We discussed Fosamax, Reclast and Prolia.  Overall I would like her to start with the Fosamax.  I believe that progression of osteoporosis was due to lack of treatment however since it is severe in her spine we will do a secondary cause work-up.  Patient wants to forward me some kind of flank about all the medication treatment for osteoporosis before I order Fosamax.  - Celiac(Gluten)Antibody Panel  - Parathyroid Hormone Intact    2. Hyperlipidemia LDL goal <130  She is not fasting today  - Lipid Cascade  - Comprehensive Metabolic Panel  - Thyroid Stimulating Hormone (TSH)    3. Occlusion of breast duct  - HM2(CBC w/o Differential)    4. Fatigue, unspecified type  - HM2(CBC w/o Differential)    5. Senile osteoporosis  She currently takes 4000 units of vitamin D twice a day  - Vitamin D, Total (25-Hydroxy)      Angelina Evans MD    Chief Complaint:  Chief Complaint   Patient presents with     Follow-up       History of Present Illness:  Verónica is a 69 y.o. female  history of depression and anxiety and problems with concentration, history of fibromyalgia, migraines, osteoporosis, IBS, environmental allergies, rosacea, hyperlipidemia, osteoporosis, family history of breast cancer and cholecystectomy and she is currently here for follow-up on recent results on bone density.    Patient has had bone density test done recently which showed severe osteoporosis with a T score of -4 in her spine and osteoporosis in her hip with a T score of -2.6.  She has had diagnosis of osteoporosis in the past but resisted treatment.  Currently  since bone density has worsened she is more interested in taking medication for it.  Currently she takes vitamin D 8000 units a day and calcium 2000 mg a day.  Discussed that it might be too much.  Will check levels today.  Discussed options for treatment.  Patient reports that she saw some kind of laying on her phone which was talking about 2 different classes of medications and wants me to review it first before ordering a medication.  We discussed because her osteoporosis is severe in the spine we will do a work-up for secondary causes of it.    Review of Systems:  A comprehensive review of systems was performed and was otherwise negative    PFSH:  Social History: Reviewed.  Currently she decided against moving to [place] and buying groceries.  Instead she is planning to go to a seminary at [place].  Social History     Tobacco Use   Smoking Status Never Smoker   Smokeless Tobacco Never Used     Social History     Social History Narrative    Patient is currently in her second marriage and has been  for 14 years.  Her daughter Ira Cardona is a patient of mine as well and they live together.  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  She wants to go back to work in the near future.       Past History: Reviewed  Current Outpatient Medications   Medication Sig Dispense Refill     5-hydroxytryptophan 50 mg cap Take 100 mg by mouth at bedtime. One tab in the morning and one tab in afternoon       amitriptyline (ELAVIL) 10 MG tablet TAKE 1 TO 2 TABLETS BY MOUTH AT BEDTIME AS NEEDED 60 tablet 12     ammonium lactate (AMLACTIN) 12 % cream Apply topically as needed for dry skin. 385 g 3     CALCIUM CARBONATE (CALCIUM 500 ORAL) Take 2,000 mg by mouth.       cholecalciferol, vitamin D3, (VITAMIN D3) 2,000 unit cap Take by mouth.       fluticasone propionate (FLONASE) 50 mcg/actuation nasal spray        GREEN TEA LEAF EXTRACT (GREEN TEA ORAL) Take by mouth as needed.         "LOTEMAX 0.5 % DrpG ADMINISTER 1 DROP TO BOTH EYES 2 (TWO) TIMES A DAY AS NEEDED. 5 g 0     metroNIDAZOLE (METROCREAM) 0.75 % cream Apply twice a day 45 g 3     omeprazole (PRILOSEC) 20 MG capsule Take 1 capsule (20 mg total) by mouth daily before breakfast. Take daily for 3 weeksk, then start every other day for 1 week, then stop. 30 capsule 0     PROPYLENE GLYCOL/ (SYSTANE OPHT) Apply to eye as needed. OTC       UNABLE TO FIND Med Name: L-Theanine       No current facility-administered medications for this visit.        Family History: Reviewed    Physical Exam:    Vitals:    12/20/19 1516   BP: 106/70   Patient Site: Left Arm   Patient Position: Sitting   Cuff Size: Adult Regular   Pulse: 72   SpO2: 98%   Weight: 172 lb (78 kg)   Height: 5' 6.34\" (1.685 m)     Wt Readings from Last 3 Encounters:   12/20/19 172 lb (78 kg)   07/15/19 168 lb (76.2 kg)   06/26/19 168 lb (76.2 kg)     Body mass index is 27.48 kg/m .    Constitutional:  Reveals a pleasant female.  Vitals:  Per nursing notes.  HEENT:No cervical LAD, no thyromegaly,  conjunctiva is pink, no scleral icterus, TMs are visualized and normal bl, oropharynx is clear, no exudates,   Cardiac:  Regular rate and rhythm,no murmurs, rubs, or gallops.  Legs without edema. Palpation of the radial pulse regular.  Lungs: Clear to auscultation bl.  Respiratory effort normal.  Abdomen:positive BS, soft, nontender, nondistended.  No hepato-splenomagaly  Skin:   Without rash, bruise, or palpable lesions.  Rheumatologic: Normal joints and nails of the hands.  Neurologic:  Cranial nerves II-XII intact.     Psychiatric: affect appropriate, although patient does have somewhat pressured speech and is very talkative.  She has interesting ideas of moving out of state and this ideas change.  I wonder if she may also have underlying or hypomanic.  Memory intact.     Data Review:    Analysis and Summary of Old Records (2): yes      Records Requested (1): no      Other History " Summarized (from other people in the room) (2): no    Radiology Tests Summarized (XRAY/CT/MRI/DXA) (1): dexa    Labs Reviewed (1): yes    Medicine Tests Reviewed (EKG/ECHO/COLONOSCOPY/EGD) (1): no    Independent Review of EKG or X-RAY (2): no

## 2021-06-04 NOTE — PATIENT INSTRUCTIONS - HE
1. Will check blood work today.    Total daily need of calcium is 1200 mg a days from diet and supplement. Each dairy portion is 300 mg.    Consider starting on oral Fosamax (will check links you send me).

## 2021-06-05 VITALS
SYSTOLIC BLOOD PRESSURE: 132 MMHG | WEIGHT: 159 LBS | HEART RATE: 109 BPM | DIASTOLIC BLOOD PRESSURE: 70 MMHG | OXYGEN SATURATION: 98 % | BODY MASS INDEX: 25.4 KG/M2

## 2021-06-05 VITALS
BODY MASS INDEX: 28.77 KG/M2 | OXYGEN SATURATION: 98 % | DIASTOLIC BLOOD PRESSURE: 68 MMHG | HEIGHT: 66 IN | HEART RATE: 79 BPM | WEIGHT: 179 LBS | SYSTOLIC BLOOD PRESSURE: 120 MMHG

## 2021-06-05 NOTE — TELEPHONE ENCOUNTER
RN Triage:    Has been feeling ill x 10 d.  Last week:   fever, chills, congestion, myalgias, fatigue decreased appetite.  This week she is feeling a bit better; fever and myalgias gone, but still has sinus congestion.  Mild-mod headache, light yellowish mucus, PND, congested cough, fatigue, pressure in face.  Home care discussed.  Appointment made in clinic for this afternoon.    Hanane Kenyon RN   [facility]    Reason for Disposition    Sinus congestion (pressure, fullness) present > 10 days    Protocols used: SINUS PAIN AND CONGESTION-A-[place]

## 2021-06-05 NOTE — PATIENT INSTRUCTIONS - HE
1.     Start Using Flonase for post nasal drainage.    Add Mucinex 600 mg twice a day to help thin out mucus.    Start On z-pack antibiotic and medrol dose pack.    2. Get flu shot once feeling better.    3. Ask insurance about Shingles vaccine coverage.

## 2021-06-05 NOTE — PROGRESS NOTES
[facility] Follow Up Note    Assessment/Plan:    1. Cough/bronchits, mild bronchospasm.  Flu swab is negative.  Snce Sx have been going on over a week and there is still significant mucus impaction, will Rx abx and Medrol.  - Influenza A/B Rapid Test  - azithromycin (ZITHROMAX) 250 MG tablet; Take 500 mg (2 x 250 mg tablets) on day 1 followed by 250 mg (1 tablet) on days 2-5.  Dispense: 6 tablet; Refill: 0  - methylPREDNISolone (MEDROL DOSEPACK) 4 mg tablet; follow package directions  Dispense: 21 tablet; Refill: 0    2. Sinus congestion  - fluticasone propionate (FLONASE) 50 mcg/actuation nasal spray; 2 sprays each nostril daily  Dispense: 16 g; Refill: 4    Pt is interested in Osteoporosis treated and was doing her own research. Interested in taking Romosozumab injection but we do not have it in the office.    Angelina Evans MD    Chief Complaint:  Chief Complaint   Patient presents with     Cough     X a couple weeks of ongoin cough, see triage     Sinus Problem       History of Present Illness:  Verónica is a 69 y.o. female with history of depression and anxiety and problems with concentration, history of fibromyalgia, migraines, osteoporosis, IBS, environmental allergies, rosacea, hyperlipidemia, osteoporosis, family history of breast cancer and cholecystectomy and she is currently here for acute visit due to URI and cough.    Sore throat, fever, PND started 10 days ago and improved, however she developed deep cough. No h/o asthma or smoking but does have significant environmental allergies and getting allergy shots for it.    Review of Systems:  A comprehensive review of systems was performed and was otherwise negative    PFSH:  Social History: reviewed  Social History     Tobacco Use   Smoking Status Never Smoker   Smokeless Tobacco Never Used     Social History     Social History Narrative    Patient is currently in her second marriage and has been  for 14 years.  Her daughter Ira  Brendan is a patient of mine as well and they live together.  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  She wants to go back to work in the near future.       Past History: reviewed  Current Outpatient Medications   Medication Sig Dispense Refill     5-hydroxytryptophan 50 mg cap Take 100 mg by mouth at bedtime. One tab in the morning and one tab in afternoon       amitriptyline (ELAVIL) 10 MG tablet TAKE 1 TO 2 TABLETS BY MOUTH AT BEDTIME AS NEEDED 60 tablet 12     ammonium lactate (AMLACTIN) 12 % cream Apply topically as needed for dry skin. 385 g 3     azithromycin (ZITHROMAX) 250 MG tablet Take 500 mg (2 x 250 mg tablets) on day 1 followed by 250 mg (1 tablet) on days 2-5. 6 tablet 0     CALCIUM CARBONATE (CALCIUM 500 ORAL) Take 2,000 mg by mouth.       cholecalciferol, vitamin D3, (VITAMIN D3) 2,000 unit cap Take by mouth.       fluticasone propionate (FLONASE) 50 mcg/actuation nasal spray 2 sprays each nostril daily 16 g 4     GREEN TEA LEAF EXTRACT (GREEN TEA ORAL) Take by mouth as needed.        LOTEMAX 0.5 % DrpG ADMINISTER 1 DROP TO BOTH EYES 2 (TWO) TIMES A DAY AS NEEDED. 5 g 0     methylPREDNISolone (MEDROL DOSEPACK) 4 mg tablet follow package directions 21 tablet 0     metroNIDAZOLE (METROCREAM) 0.75 % cream Apply twice a day 45 g 3     omeprazole (PRILOSEC) 20 MG capsule Take 1 capsule (20 mg total) by mouth daily before breakfast. Take daily for 3 weeksk, then start every other day for 1 week, then stop. 30 capsule 0     PROPYLENE GLYCOL/ (SYSTANE OPHT) Apply to eye as needed. OTC       UNABLE TO FIND Med Name: L-Theanine       No current facility-administered medications for this visit.        Family History: reviewed    Physical Exam:    Vitals:    01/23/20 1515   BP: 126/80   Patient Site: Left Arm   Patient Position: Sitting   Cuff Size: Adult Large   Pulse: 80   Temp: 98.4  F (36.9  C)   TempSrc: Oral   SpO2: 98%   Weight: 168 lb (76.2 kg)     Wt  Readings from Last 3 Encounters:   01/23/20 168 lb (76.2 kg)   12/20/19 172 lb (78 kg)   07/15/19 168 lb (76.2 kg)     Body mass index is 26.84 kg/m .    Constitutional:  Reveals a pleasant female.  Vitals:  Per nursing notes.  HEENT:No cervical LAD, no thyromegaly,  conjunctiva is pink, no scleral icterus, TMs are visualized and normal bl, oropharynx is clear, no exudates, mild sinus pressure to palpation  Cardiac:  Regular rate and rhythm,no murmurs, rubs, or gallops. Carotids without bruits. Legs without edema. Palpation of the radial pulse regular.  Lungs: Clear to auscultation bl.  Respiratory effort normal. When she cough there is mild bronchospasm and mucus entrapment.  Abdomen:positive BS, soft, nontender, nondistended.  No hepato-splenomagaly  Skin:   Without rash, bruise, or palpable lesions.  Rheumatologic: Normal joints and nails of the hands.  Neurologic:  Cranial nerves II-XII intact.     Psychiatric: affect appropriate, memory intact.     Data Review:    Analysis and Summary of Old Records (2): yes      Records Requested (1): no      Other History Summarized (from other people in the room) (2): no    Radiology Tests Summarized (XRAY/CT/MRI/DXA) (1): no    Labs Reviewed (1): no    Medicine Tests Reviewed (EKG/ECHO/COLONOSCOPY/EGD) (1): no    Independent Review of EKG or X-RAY (2): no

## 2021-06-07 NOTE — TELEPHONE ENCOUNTER
"Who is calling:  Patient   Reason for Call:  Patient stated that when she saw Dr. Evans in January, she was not tested for COVID- but felt as if the may have had this at that time.  Patient would like to let Dr. Evans know that she may have the \"[place] plasma\" that could be tested to be helpful.  Patient was informed that at this time the general public is not being tested for anti-bodies, but she wanted to let Dr. Chapa know regardless  Date of last appointment with primary care: 01.23.2020  Okay to leave a detailed message: Yes      "

## 2021-06-07 NOTE — TELEPHONE ENCOUNTER
RN cannot approve Refill Request    RN can NOT refill this medication med is not covered by policy/route to provider       . Last office visit: 1/23/2020 Angelina Evans MD Last Physical: Visit date not found Last MTM visit: Visit date not found Last visit same specialty: 1/23/2020 Angelina Evans MD.  Next visit within 3 mo: Visit date not found  Next physical within 3 mo: Visit date not found      Madyson Kim, [facility] Connection Triage/Med Refill 4/6/2020    Requested Prescriptions   Pending Prescriptions Disp Refills     LOTEMAX 0.5 % DrpG [Pharmacy Med Name: LOTEMAX 0.5% OPHTHALMIC GEL 0.5 GEL] 5 g 0     Sig: ADMINISTER 1 DROP TO BOTH EYES 2 (TWO) TIMES A DAY AS NEEDED.       There is no refill protocol information for this order

## 2021-06-10 NOTE — PROGRESS NOTES
[facility] Clinic Follow Up Note    Assessment/Plan:  1. Mild Recurrent Major Depression  Patient is doing well on immediate release Wellbutrin and would like to take it 3 times a day.  Refill was provided  - buPROPion (WELLBUTRIN) 75 MG tablet; Take 1 tablet (75 mg total) by mouth 3 (three) times a day.  Dispense: 180 tablet; Refill: 2    2. URI (upper respiratory infection) for his recurrent symptoms and sinusitis  We will start on nasal sprays and [place] pot as well as azithromycin.  - fluticasone (FLONASE) 50 mcg/actuation nasal spray; 2 spays each nostril daily  Dispense: 16 g; Refill: 12  - azithromycin (ZITHROMAX) 500 MG tablet; Take 1 tablet (500 mg total) by mouth daily for 5 days.  Dispense: 5 tablet; Refill: 0    3. Rosacea  Can take doxycycline and eyedrops as needed.  Refills were provided  - doxycycline (PERIOSTAT) 20 MG tablet; Take 1 tablet (20 mg total) by mouth 2 (two) times a day.  Dispense: 60 tablet; Refill: 3  - LOTEMAX 0.5 % DrpG; Administer 1 drop to both eyes 2 (two) times a day as needed.  Dispense: 5 g; Refill: 0      Health maintenance: Patient has family history of breast cancer.  Mammogram was ordered in the past but she has not schedule it yet.  She usually does it at [facility] and will go ahead and schedule it in the near future.    Angelina Evans MD    Chief Complaint:  Chief Complaint   Patient presents with     URI     [id number]       History of Present Illness:  Verónica is a 67 y.o. female with history of depression and anxiety and problems with concentration, history of fibromyalgia, migraines, osteoporosis, IBS, environmental allergies, rosacea, hyperlipidemia, family history of breast cancer and cholecystectomy she is currently here for acute visit due to upper respiratory infection.    Patient got first upper respiratory infection 3 weeks ago.  She had headaches, sore throat, congestion and chest tightness and fevers at that time.  After 2 weeks she started  feeling better in the following week symptoms developed all over again.  Her  and daughter were in the midst of upper respiratory infection at that time as well.  Most recently her symptoms started 5 days ago and is sore throat and cough and some sinus pressure.  She denies any fevers or chills but her appetite is low.  She does have mild diffuse muscle aches.  She has been using TheraFlu for the last 7-8 days.  She denies any sore throat.  She has some postnasal drainage and mild sinus pressure in the front.  She has no history of asthma.  She has no nausea, stools have been slightly soft.    Patient does have problems with concentration and depression.  Currently she is taking Wellbutrin 75 mg twice a day and would like to take it 3 times a day.  She likes being on immediate release medication rather than sustained-release.    Patient also has rosacea for which she takes small dose of doxycycline as needed and also gets eyedrops for frequent eye irritation.  She is followed by ophthalmologist.  Her scripts were refilled today.    Review of Systems:  A comprehensive review of systems was performed and was otherwise negative    PFSH:  Social History: Reviewed  History   Smoking Status     Never Smoker   Smokeless Tobacco     Not on file     Social History     Social History Narrative    Patient is currently in her second marriage and has been  for 14 years.  Her daughter Ira Cardona is a patient of mine as well and they live together.  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  She wants to go back to work in the near future.       Past History: Reviewed  Current Outpatient Prescriptions   Medication Sig Dispense Refill     5-hydroxytryptophan 50 mg cap Take 100 mg by mouth bedtime.        ammonium lactate (AMLACTIN) 12 % cream Apply topically as needed for dry skin. 385 g 3     buPROPion (WELLBUTRIN) 75 MG tablet Take 1 tablet (75 mg total) by mouth 3 (three)  "times a day. 180 tablet 2     CALCIUM CARBONATE (CALCIUM 500 ORAL) Take 2,000 mg by mouth.       cholecalciferol, vitamin D3, (VITAMIN D3) 2,000 unit cap Take by mouth.       doxycycline (PERIOSTAT) 20 MG tablet Take 1 tablet (20 mg total) by mouth 2 (two) times a day. 60 tablet 3     GREEN TEA LEAF EXTRACT (GREEN TEA ORAL) Take by mouth as needed.        hypochlorous acid-sodium chlor (AVENOVA) 0.01 % Spry Apply topically.       LOTEMAX 0.5 % DrpG Administer 1 drop to both eyes 2 (two) times a day as needed. 5 g 0     PROPYLENE GLYCOL/ (SYSTANE OPHT) Apply to eye as needed. OTC       UNABLE TO FIND Med Name: L-Theanine       azithromycin (ZITHROMAX) 500 MG tablet Take 1 tablet (500 mg total) by mouth daily for 5 days. 5 tablet 0     fluticasone (FLONASE) 50 mcg/actuation nasal spray 2 spays each nostril daily 16 g 12     No current facility-administered medications for this visit.        Physical Exam:    Vitals:    04/17/17 1514   BP: 110/64   Patient Site: Left Arm   Patient Position: Sitting   Cuff Size: Adult Regular   Pulse: 84   Resp: 16   SpO2: 96%   Weight: 153 lb 4 oz (69.5 kg)   Height: 5' 7\" (1.702 m)     Wt Readings from Last 3 Encounters:   04/17/17 153 lb 4 oz (69.5 kg)   12/22/16 164 lb (74.4 kg)   12/22/16 164 lb (74.4 kg)     Body mass index is 24 kg/(m^2).    Constitutional:  Reveals a pleasant young female.  Vitals:  Per nursing notes.  HEENT:No cervical LAD, no thyromegaly,  conjunctiva is pink, no scleral icterus, TMs are visualized and normal bl, oropharynx is clear, no exudates, she wears hearing aids, she has mild tenderness to palpation of her frontal sinus.  Cardiac:  Regular rate and rhythm,no murmurs, rubs, or gallops.  Legs without edema. Palpation of the radial pulse regular.  Lungs: Clear to auscultation bl.  Respiratory effort normal.  No wheezes or rales, when she coughs there is no mucus at the bases  Abdomen:positive BS, soft, nontender, nondistended.  No " hepato-splenomagaly  Skin:   Without rash, bruise, or palpable lesions.  Psychiatric: affect appropriate, memory intact.     Data Review:    Analysis and Summary of Old Records (2): Yes    Records Requested (1): No    Other History Summarized (from other people in the room) (2): No    Radiology Tests Summarized (XRAY/CT/MRI/DXA) (1): No    Labs Reviewed (1): Yes    Medicine Tests Reviewed (EKG/ECHO/COLONOSCOPY/EGD) (1): No    Independent Review of EKG or X-RAY (2): No

## 2021-06-10 NOTE — PROGRESS NOTES
[facility] Clinic Follow Up Note    Assessment/Plan:    1. Sore throat  Suspect it is allergic in nature due to ongoing postnasal drainage.  We will do strep screen to rule out infection.  Also discussed possibility of acid reflux and a trial of PPI but patient is refused.  She currently is awaiting appointment with his allergist for allergy testing but afterwards we discussed that she may consider trying Singulair.  She wants deferred to allergist for that medication.  - Rapid Strep A Screen-Throat  - Group A Strep, RNA Direct Detection, Throat    2.  Sinus congestion  Patient recently completed Augmentin.  His symptoms gotten better but she still has some residual sinus discomfort.  I recommended that she uses [place] pot twice a day.  She will try Singulair.  If symptoms persist she should see ENT for further evaluation of possible obstruction.    -Angelina Evans MD    Chief Complaint:  Chief Complaint   Patient presents with     Sinusitis     Sore Throat       History of Present Illness:  Verónica is a 67 y.o. female with history of depression and anxiety and problems with concentration, history of fibromyalgia, migraines, osteoporosis, IBS, environmental allergies, rosacea, hyperlipidemia, family history of breast cancer and cholecystectomy she is currently here for follow-up on her recent sinus infection and complains about ongoing sore throat.    Patient had upper respiratory infection in the mid April.  Subsequently she developed worsening sinus pain and congestion and was treated with Augmentin for 14 days.  At that time she also saw ENT doctor who did not recommend any change in regimen.  She does have history of deviated septum.  She complains about lymphadenopathy which was painful on both sides of her neck but is getting better.  Before she started Augmentin she did have some sore throat and it has gotten better but currently it is back.  The most painful in the morning and better later in the  "day.  It hurts when she swallows but she is able to do without problem.  She denies any acid reflux symptoms.  She complains about feeling \"underwater\" due to ongoing postnasal drainage and ear congestion.  She denies any ear pain.  Currently she denies any cough.  No sinus pressure.  She still doing [place] pot in the morning.  In the past she saw her allergist and was positive for dust mite allergies.  Currently she has appointment with his allergist again next week.  She has not been using Flonase because it makes her hyper.  She also has dry eyes and unable to do antihistamine decongestion because it makes her symptoms worse.    Review of Systems:  A comprehensive review of systems was performed and was otherwise negative    PFSH:  Social History: Reviewed  History   Smoking Status     Never Smoker   Smokeless Tobacco     Not on file     Social History     Social History Narrative    Patient is currently in her second marriage and has been  for 14 years.  Her daughter Ira Cardona is a patient of mine as well and they live together.  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  She wants to go back to work in the near future.       Past History: Reviewed  Current Outpatient Prescriptions   Medication Sig Dispense Refill     5-hydroxytryptophan 50 mg cap Take 100 mg by mouth bedtime.        ammonium lactate (AMLACTIN) 12 % cream Apply topically as needed for dry skin. 385 g 3     buPROPion (WELLBUTRIN) 75 MG tablet Take 1 tablet (75 mg total) by mouth 3 (three) times a day. 180 tablet 2     CALCIUM CARBONATE (CALCIUM 500 ORAL) Take 2,000 mg by mouth.       cholecalciferol, vitamin D3, (VITAMIN D3) 2,000 unit cap Take by mouth.       doxycycline (PERIOSTAT) 20 MG tablet Take 1 tablet (20 mg total) by mouth 2 (two) times a day. 60 tablet 3     fluticasone (FLONASE) 50 mcg/actuation nasal spray 2 spays each nostril daily 16 g 12     GREEN TEA LEAF EXTRACT (GREEN TEA ORAL) " "Take by mouth as needed.        hypochlorous acid-sodium chlor (AVENOVA) 0.01 % Spry Apply topically.       LOTEMAX 0.5 % DrpG Administer 1 drop to both eyes 2 (two) times a day as needed. 5 g 0     PROPYLENE GLYCOL/ (SYSTANE OPHT) Apply to eye as needed. OTC       UNABLE TO FIND Med Name: L-Theanine       No current facility-administered medications for this visit.        Family History: Reviewed    Physical Exam:    Vitals:    05/18/17 1158   BP: 104/60   Patient Site: Left Arm   Patient Position: Sitting   Cuff Size: Adult Regular   Pulse: 68   Resp: 18   Temp: 97.5  F (36.4  C)   TempSrc: Tympanic   SpO2: 98%   Weight: 157 lb 7 oz (71.4 kg)   Height: 5' 7\" (1.702 m)     Wt Readings from Last 3 Encounters:   05/18/17 157 lb 7 oz (71.4 kg)   04/17/17 153 lb 4 oz (69.5 kg)   12/22/16 164 lb (74.4 kg)     Body mass index is 24.66 kg/(m^2).    Constitutional:  Reveals a pleasant but somewhat anxious female.  Vitals:  Per nursing notes.  HEENT:No cervical LAD, no thyromegaly,  conjunctiva is pink, no scleral icterus, TMs are visualized and normal bl, oropharynx is clear, no exudates, slight nasal congestion, she still has mild tenderness to palpation of the right frontal sinus.  Her septum is deviated to the right.  Cardiac:  Regular rate and rhythm,no murmurs, rubs, or gallops. Legs without edema. Palpation of the radial pulse regular.  Lungs: Clear to auscultation bl.  Respiratory effort normal.  Abdomen:positive BS, soft, nontender, nondistended.  No hepato-splenomagaly  Psychiatric: affect appropriate, memory intact.  Mild anxiety noted.    Data Review:    Analysis and Summary of Old Records (2): Yes    Records Requested (1): No    Other History Summarized (from other people in the room) (2): No    Radiology Tests Summarized (XRAY/CT/MRI/DXA) (1): No    Labs Reviewed (1): Yes    Medicine Tests Reviewed (EKG/ECHO/COLONOSCOPY/EGD) (1): No    Independent Review of EKG or X-RAY (2): No    "

## 2021-06-14 NOTE — TELEPHONE ENCOUNTER
Attempted to contact pt, vm not set up, unable to leave message.  Will try again later.  If pt calls, please relay pcp message to pt.

## 2021-06-14 NOTE — TELEPHONE ENCOUNTER
Patient called regarding a letter she received with her lab results (cholesterol). Wanted to report that her cholesterol has been high for over a decade. She is seeking a prescription to help lower her cholesterol now instead of waiting 6 months.     Patient reports that going on amitriptyline has helped but patient has lost a few because the pills are so small. Patient wants an increase from 1-2 to 2-3 pills daily. She reports only wanting to take 2 per day but because she loses some, she would like some extras just in case. Appears there may be insurance issues, but insurance recently changed, and she is not sure what will be covered.     Patient separately reports a lot of loneliness and a lot of stress related to her granddaughter. Feels trapped by her . Denies concerns for safety.

## 2021-06-14 NOTE — TELEPHONE ENCOUNTER
Left message to call back for: Verónica on her home number  Information to relay to patient:  Message from Dr. Evans.  Patient should have follow up lab work in 3 months after starting statin drug.

## 2021-06-14 NOTE — TELEPHONE ENCOUNTER
Please let her know, I sent refills for Amytriptilline in and Lipitor order for cholesterol sent to her pharmacy.

## 2021-06-14 NOTE — TELEPHONE ENCOUNTER
RN cannot approve Refill Request    RN can NOT refill this medication Patient wants different directions to take 2-3 at bedtime and not 1-2 at bedtime. Last office visit: 1/23/2020 Angelina Evans MD Last Physical: 12/4/2020 Last MTM visit: Visit date not found Last visit same specialty: 1/23/2020 Angelina Evans MD.  Next visit within 3 mo: Visit date not found  Next physical within 3 mo: Visit date not found      [facility], [facility] Triage/Med Refill 12/25/2020    Requested Prescriptions   Pending Prescriptions Disp Refills     amitriptyline (ELAVIL) 10 MG tablet [Pharmacy Med Name: AMITRIPTYLINE HCL 10 MG TAB 10 Tablet] 60 tablet 12     Sig: TAKE 1 TO 2 TABLETS BY MOUTH AT BEDTIME AS NEEDED       Tricyclics/Misc Antidepressant/Antianxiety Meds Refill Protocol Passed - 12/22/2020  2:56 PM        Passed - PCP or prescribing provider visit in last year     Last office visit with prescriber/PCP: 1/23/2020 Angelina Evans MD OR same dept: 1/23/2020 Angelina Evans MD OR same specialty: 1/23/2020 Angelina Evans MD  Last physical: 12/4/2020 Last MTM visit: Visit date not found   Next visit within 3 mo: Visit date not found  Next physical within 3 mo: Visit date not found  Prescriber OR PCP: Angelina Evans MD  Last diagnosis associated with med order: 1. Primary insomnia  - amitriptyline (ELAVIL) 10 MG tablet [Pharmacy Med Name: AMITRIPTYLINE HCL 10 MG TAB 10 Tablet]; TAKE 1 TO 2 TABLETS BY MOUTH AT BEDTIME AS NEEDED  Dispense: 60 tablet; Refill: 12    If protocol passes may refill for 12 months if within 3 months of last provider visit (or a total of 15 months).

## 2021-06-15 NOTE — PROGRESS NOTES
[facility] Follow Up Note    Assessment/Plan:    1. Blepharitis of upper eyelids of both eyes, unspecified type  She will try a small dose of doxycycline which helped in the past.  Discussed that she can do it for 6 weeks and then see if she can taper off of it.  She is aware that doxycycline can cause sunburns if she does not protect herself from sun as well as esophagitis.  - doxycycline (PERIOSTAT) 20 MG tablet; Take 1 tablet (20 mg total) by mouth 2 (two) times a day.  Dispense: 180 tablet; Refill: 2    2. Pain of finger of left hand  Synovitis on exam but she does have decreased ability to flex her pinky finger.  We will do an x-ray.  If that is normal will refer her to orthopedist for evaluation, she may have possible ligament problem.  - XR Finger Left 2 or More VWS    3.  Osteoporosis.  Bone density showed T score of -4.0 in her spine.  She finally agreed to start oral Fosamax but sometimes does not remember to take it on time.  She has appointment scheduled with [place] osteoporosis specialist.  Discussed that she would be a good candidate for IV Reclast especially in light of not having any side effects from Fosamax.    Angelina Evans MD    Chief Complaint:  Chief Complaint   Patient presents with     Finger Injury     might have broken it       History of Present Illness:  Verónica is a 71 y.o. female with history of depression and anxiety and problems with concentration, history of fibromyalgia, migraines, osteoporosis, IBS, environmental allergies, rosacea, hyperlipidemia, osteoporosis, family history of breast cancer and cholecystectomy .  She is currently here for acute visit to discuss several things.    Since I last saw her she lost 20 pounds.  She has been using intermittent fasting diet.  She denies using any dietary supplements.  On exam today her blood pressure is slightly higher and she is slightly tachycardic but she attributes it to anxiety.    She also has gotten worsening  blepharitis and saw her ophthalmologist who put her back on doxycycline.  In the past she did well on 20 mg twice a day but he gave her prescription 400 mg and she is anxious to take such a high dose.  She would like me to give her a smaller dose prescription.    She also have been having pain in her left pinky.  She thinks that she might have injured it 6 weeks ago.  Currently pain is worse with moving and there is slightly decreased range of motion.  She does type a lot but does not use her pinky for typing.    She is also scheduled to see osteoporosis specialist at [facility].  She is taking Fosamax but sometimes forgets to take it weekly.  Previously her osteoporosis was fairly severe with a T score of -4.0 in her spine.  Discussed that certainly she would be a good candidate for IV Reclast especially in the light of not having any side effects from oral Fosamax.    Review of Systems:  A comprehensive review of systems was performed and was otherwise negative    PFSH:  Social History: Reviewed  Social History     Tobacco Use   Smoking Status Never Smoker   Smokeless Tobacco Never Used     Social History     Social History Narrative    Patient is currently in her second marriage and has been  for 14 years.  Her daughter Ira Cardona is a patient of mine as well and they live together.  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  She wants to go back to work in the near future.       Past History: Reviewed  Current Outpatient Medications   Medication Sig Dispense Refill     5-hydroxytryptophan 50 mg cap Take 100 mg by mouth at bedtime. One tab in the morning and one tab in afternoon       alendronate (FOSAMAX) 70 MG tablet Take 1 tablet (70 mg total) by mouth every 7 days. Take in the morning on an empty stomach with a full glass of water 30 minutes before food 12 tablet 3     amitriptyline (ELAVIL) 10 MG tablet 2-3 tabs a day as needed 90 tablet 12      ammonium lactate (AMLACTIN) 12 % cream Apply topically as needed for dry skin. 385 g 3     atorvastatin (LIPITOR) 20 MG tablet Take 1 tablet (20 mg total) by mouth daily. 90 tablet 2     CALCIUM CARBONATE (CALCIUM 500 ORAL) Take 2,000 mg by mouth.       cholecalciferol, vitamin D3, (VITAMIN D3) 2,000 unit cap Take by mouth.       doxycycline (VIBRAMYCIN) 100 MG capsule Take 100 mg by mouth 2 (two) times a day.       erythromycin ophthalmic ointment at bedtime.       fluticasone propionate (FLONASE) 50 mcg/actuation nasal spray USE 2 SPRAYS IN EACH NOSTRIL DAILY 16 g 10     GREEN TEA LEAF EXTRACT (GREEN TEA ORAL) Take by mouth as needed.        LOTEMAX 0.5 % DrpG ADMINISTER 1 DROP TO BOTH EYES 2 (TWO) TIMES A DAY AS NEEDED. 5 g 0     metroNIDAZOLE (METROCREAM) 0.75 % cream Apply twice a day 45 g 3     NON FORMULARY cliradex-for eyes       PROPYLENE GLYCOL/ (SYSTANE OPHT) Apply to eye as needed. OTC       UNABLE TO FIND Med Name: L-Theanine       doxycycline (PERIOSTAT) 20 MG tablet Take 1 tablet (20 mg total) by mouth 2 (two) times a day. 180 tablet 2     No current facility-administered medications for this visit.        Family History:     Physical Exam:    Vitals:    03/11/21 1339   BP: 132/70   Pulse: (!) 109   SpO2: 98%   Weight: 159 lb (72.1 kg)     Wt Readings from Last 3 Encounters:   03/11/21 159 lb (72.1 kg)   12/04/20 179 lb (81.2 kg)   01/23/20 168 lb (76.2 kg)     Body mass index is 25.4 kg/m .    Constitutional:  Reveals a pleasant female.  Vitals:  Per nursing notes.  HEENT:No cervical LAD, no thyromegaly,  conjunctiva is pink, no scleral icterus, TMs are visualized and normal bl, oropharynx is clear, no exudates,   Cardiac:  Regular rate and rhythm,no murmurs, rubs, or gallops.  Mild tachycardia noted.  Legs without edema. Palpation of the radial pulse regular.  Lungs: Clear to auscultation bl.  Respiratory effort normal.  Abdomen:positive BS, soft, nontender, nondistended.  No  hepato-splenomagaly  Skin:   Without rash, bruise, or palpable lesions.  Rheumatologic: Normal joints and nails of the hands.  No deformity in her hands.  Her left pinky finger however does not bend all the way, currently there is no active synovitis.  Neurologic:  Cranial nerves II-XII intact.     Psychiatric: affect appropriate, memory intact.  He does have mild problem with concentration.      Data Review:    Analysis and Summary of Old Records (2): yes      Records Requested (1): no      Other History Summarized (from other people in the room) (2): no    Radiology Tests Summarized (XRAY/CT/MRI/DXA) (1): no    Labs Reviewed (1): no    Medicine Tests Reviewed (EKG/ECHO/COLONOSCOPY/EGD) (1): no    Independent Review of EKG or X-RAY (2): no

## 2021-06-15 NOTE — TELEPHONE ENCOUNTER
Refill Approved    Rx renewed per Medication Renewal Policy. Medication was last renewed on 1/23/20, last OV 12/4/20.    Emmy Hubbard, [facility] Triage/Med Refill 2/20/2021     Requested Prescriptions   Pending Prescriptions Disp Refills     fluticasone propionate (FLONASE) 50 mcg/actuation nasal spray [Pharmacy Med Name: FLUTICASONE 50MCG NASAL SPR 50 MCG SPRY] 16 g 10     Sig: USE 2 SPRAYS IN EACH NOSTRIL DAILY       Nasal Steroid Refill Protocol Passed - 2/19/2021  3:00 PM        Passed - Patient has had office visit/physical in last 2 years     Last office visit with prescriber/PCP: 1/23/2020 OR same dept: Visit date not found OR same specialty: 1/23/2020 Angelina Evans MD Last physical: 12/4/2020 Last MTM visit: Visit date not found    Next appt within 3 mo: Visit date not found  Next physical within 3 mo: Visit date not found  Prescriber OR PCP: Angelina Evans MD  Last diagnosis associated with med order: 1. Sinus congestion  - fluticasone propionate (FLONASE) 50 mcg/actuation nasal spray [Pharmacy Med Name: FLUTICASONE 50MCG NASAL SPR 50 MCG SPRY]; USE 2 SPRAYS IN EACH NOSTRIL DAILY  Dispense: 16 g; Refill: 10     If protocol passes may refill for 12 months if within 3 months of last provider visit (or a total of 15 months).

## 2021-06-16 NOTE — PROGRESS NOTES
Subjective findings: The patient presented to the clinic today complaining of a loosely attached left great toenail.  She stated that she recently injured her left great toenail.  She has not had any redness, swelling, drainage or bleeding.  She has no pain.  She stated that the loose nail however has been getting caught on her socks and shoes.    Objective findings: Nails bilateral feet are normal length and color.  There is a transverse crack in the nail plate left great toenail.  There is no edema, erythema, cellulitis, drainage or bleeding noted.  DP and PT pulses are palpable.  Capillary refill less than 2 seconds bilaterally.  Negative clonus negative Babinski bilateral feet.  Range of motion within normal limits bilateral feet.  Muscle power +5/5 bilaterally in all compartments.    Assessment: Onychauxis    Plan: Trimmed left great toenail today.  I informed the patient that her left great toenail will regrow and should not cause her any problems in the future.  She is to return to the clinic as needed.

## 2021-06-16 NOTE — PROGRESS NOTES
[facility] Follow Up Note    Assessment/Plan:    1. Mild Recurrent Major Depression  Due to weight gain patient has stopped Wellbutrin and amitriptyline 6 weeks ago and started 5 HTP supplement.  She reports that she is feeling great.  Her depression and anxiety is controlled and she has had less of an appetite.  She is hoping to lose more weight.  If that does not happen patient did want to try Wellbutrin/naltrexone combination pill in the future.    2. Weight gain  Discussed increasing physical activity and decreasing calories.  Will check TSH  - Thyroid Stimulating Hormone (TSH)    3. Vitamin D deficiency  Patient has been off vitamin D supplement for a while but currently restarted  - Vitamin D, Total (25-Hydroxy)    4. Need for vaccination  - Pneumococcal conjugate vaccine 13-valent 6wks-17yrs; >50yrs    5. Rosacea  Is requesting doxycycline prescription which she has been taking chronically for many years.  Discussed that I dislike indeterminate use of antibiotics.  We will try topical metronidazole cream.  I recommended zinc oxide sunscreens.  - metroNIDAZOLE (METROCREAM) 0.75 % cream; Apply twice a day  Dispense: 45 g; Refill: 3    Angelina Evans MD    Chief Complaint:  Chief Complaint   Patient presents with     Weight Gain       History of Present Illness:  Verónica is a 68 y.o. female with history of depression and anxiety and problems with concentration, history of fibromyalgia, migraines, osteoporosis, IBS, environmental allergies, rosacea, hyperlipidemia, family history of breast cancer and cholecystectomy she is currently here for follow-up.    Currently patient reports that she had stopped her amitriptyline and Wellbutrin 6 weeks ago and started 5 HTP supplement (hydroxytryptophan).  She reports that her mood is much better.  Previously when she was on antidepressants she was concerned because her concentration was still poor and she was feeling hungry all the time and she has gained 10  pounds in the last year.  This her current supplements mentally she is feeling well and she also feels that her appetite has diminished.  She is followed up with his psychologist.  She recently got hearing aids to help with her hearing difficulty.  She has been staying busy by hosting a foreign student who lives in her house and staying active politically.  She reports that her marriage is unhappy but she is not anxious to get out of it.    Patient does want to lose weight.  Currently since she is switched her antidepressants her appetite has decreased.  We discussed cutting back on calories.  The patient wanted to try Wellbutrin naltrexone combination weight loss pill but currently since she stopped Wellbutrin herself we will see how she does in her current 5 HTP supplement.    Patient also has rosacea and has been using doxycycline chronically for many years.  She is asking me for refill.  Discussed that I prefer patient does not stay on antibiotics indeterminately.  We will try topical metronidazole cream.  Discussed importance of sunscreen as well.    Review of Systems:  A comprehensive review of systems was performed and was otherwise negative    PFSH:  Social History: Viewed  History   Smoking Status     Never Smoker   Smokeless Tobacco     Never Used     Social History     Social History Narrative    Patient is currently in her second marriage and has been  for 14 years.  Her daughter Ira Cardona is a patient of mine as well and they live together.  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  She wants to go back to work in the near future.       Past History: Reviewed  Current Outpatient Prescriptions   Medication Sig Dispense Refill     5-hydroxytryptophan 50 mg cap Take 100 mg by mouth at bedtime. One tab in the morning and one tab in afternoon       hypochlorous acid-sodium chlor (AVENOVA) 0.01 % Spry Apply topically.       LOTEMAX 0.5 % DrpG Administer 1 drop  to both eyes 2 (two) times a day as needed. 5 g 0     ammonium lactate (AMLACTIN) 12 % cream Apply topically as needed for dry skin. 385 g 3     CALCIUM CARBONATE (CALCIUM 500 ORAL) Take 2,000 mg by mouth.       cholecalciferol, vitamin D3, (VITAMIN D3) 2,000 unit cap Take by mouth.       doxycycline (PERIOSTAT) 20 MG tablet Take 1 tablet (20 mg total) by mouth 2 (two) times a day. 60 tablet 3     GREEN TEA LEAF EXTRACT (GREEN TEA ORAL) Take by mouth as needed.        metroNIDAZOLE (METROCREAM) 0.75 % cream Apply twice a day 45 g 3     PROPYLENE GLYCOL/ (SYSTANE OPHT) Apply to eye as needed. OTC       UNABLE TO FIND Med Name: L-Theanine       No current facility-administered medications for this visit.        Physical Exam:    Vitals:    03/01/18 1245   BP: 114/62   Patient Site: Left Arm   Patient Position: Sitting   Cuff Size: Adult Regular   Pulse: 84   SpO2: 97%   Weight: 168 lb 7 oz (76.4 kg)     Wt Readings from Last 3 Encounters:   03/01/18 168 lb 7 oz (76.4 kg)   05/18/17 157 lb 7 oz (71.4 kg)   04/17/17 153 lb 4 oz (69.5 kg)     Body mass index is 26.38 kg/(m^2).    Constitutional:  Reveals a pleasant female, hard of hearing.  Vitals:  Per nursing notes.  HEENT:No cervical LAD, no thyromegaly,  conjunctiva is pink, no scleral icterus, TMs are visualized and normal bl, oropharynx is clear, no exudates,   Cardiac:  Regular rate and rhythm,no murmurs, rubs, or gallops.  Legs without edema. Palpation of the radial pulse regular.  Lungs: Clear to auscultation bl.  Respiratory effort normal.  Abdomen:positive BS, soft, nontender, nondistended.  No hepato-splenomagaly  Skin: Mild facial rosacea without acne or telangiectasia  Rheumatologic: Normal joints and nails of the hands.  Neurologic:  Cranial nerves II-XII intact.     Psychiatric: affect appropriate, memory intact.  Moderate anxiety noted.    Data Review:    Analysis and Summary of Old Records (2): Yes    Records Requested (1): No    Other History  Summarized (from other people in the room) (2): No    Radiology Tests Summarized (XRAY/CT/MRI/DXA) (1): No    Labs Reviewed (1): Yes    Medicine Tests Reviewed (EKG/ECHO/COLONOSCOPY/EGD) (1): No    Independent Review of EKG or X-RAY (2): No

## 2021-06-17 NOTE — TELEPHONE ENCOUNTER
Reason for Call:  Other call back    [facility]  [facility]    Detailed comments: Pt was seen at [facility] for Osterporosis and was given medication (Tymlos (Abaloparatide) this is a daily injection - pt is to do this for 20 months -     This is a FYI for PCP     Phone Number Patient can be reached at: Cell number on file:    Telephone Information:   Mobile [phone]       Best Time: anytime -- Pt does not have voice mail do to hearing deficit    Can we leave a detailed message on this number?: No    Call taken on 5/7/2021 at 12:34 PM by Piedad Wilkerson

## 2021-06-17 NOTE — PATIENT INSTRUCTIONS - HE
Patient Instructions by Angelina Evans MD at 6/26/2019  3:40 PM     Author: Angelina Evans MD Service: -- Author Type: Physician    Filed: 6/26/2019  4:23 PM Encounter Date: 6/26/2019 Status: Addendum    : Angelina Evans MD (Physician)    Related Notes: Original Note by Angelina Evans MD (Physician) filed at 6/26/2019  4:20 PM       1. Most likely oil gland plugged in the right nipple, but to be though, we will check diagnostic mammogram and US to make sure there is nothing more then that.    2. Have bone density tst done    3. Next colonoscopy due in 2025    4. Come back for fasting blood work.    5. Last bone density showed osteoporosis, if it is worse on repeat DEXA scan, we will need to start treatment.  Patient Education     Exercise for a Healthier Heart  You may wonder how you can improve the health of your heart. If youre thinking about exercise, youre on the right track. You dont need to become an athlete, but you do need a certain amount of brisk exercise to help strengthen your heart. If you have been diagnosed with a heart condition, your doctor may recommend exercise to help stabilize your condition. To help make exercise a habit, choose safe, fun activities.       Be sure to check with your health care provider before starting an exercise program.    Why exercise?  Exercising regularly offers many healthy rewards. It can help you do all of the following:    Improve your blood cholesterol levels to help prevent further heart trouble    Lower your blood pressure to help prevent a stroke or heart attack    Control diabetes, or reduce your risk of getting this disease    Improve your heart and lung function    Reach and maintain a healthy weight    Make your muscles stronger and more limber so you can stay active    Prevent falls and fractures by slowing the loss of bone mass (osteoporosis)    Manage stress better  Exercise tips  Ease into your routine. Set small goals. Then build  on them.  Exercise on most days. Aim for a total of 150 or more minutes of moderate to  vigorous intensity activity each week. Consider 40 minutes, 3 to 4 times a week. For best results, activity should last for 40 minutes on average. It is OK to work up to the 40 minute period over time. Examples of moderate-intensity activity is walking one mile in 15 minutes or 30 to 45 minutes of yard work.  Step up your daily activity level. Along with your exercise program, try being more active throughout the day. Walk instead of drive. Do more household tasks or yard work.  Choose one or more activities you enjoy. Walking is one of the easiest things you can do. You can also try swimming, riding a bike, or taking an exercise class.  Stop exercising and call your doctor if you:    Have chest pain or feel dizzy or lightheaded    Feel burning, tightness, pressure, or heaviness in your chest, neck, shoulders, back, or arms    Have unusual shortness of breath    Have increased joint or muscle pain    Have palpitations or an irregular heartbeat      5600-2399 [employer]. [address] [place] [address]. All rights reserved. This information is not intended as a substitute for professional medical care. Always follow your healthcare professional's instructions.         Patient Education   Signs of Hearing Loss  Hearing loss is a problem shared by many people. In fact, it is one of the most common health conditions, particularly as people age. Most people over age 65 have some hearing loss, and by age 80, almost everyone does. Because hearing loss usually occurs slowly over the years, you may not realize your hearing ability has gotten worse.       Have your hearing checked  Contact your [facility] care provider if you:    Have to strain to hear normal conversation.    Have to watch other peoples faces very carefully to follow what theyre saying.    Need to ask people to repeat what theyve said.    Often  misunderstand what people are saying.    Turn the volume of the television or radio up so high that others complain.    Feel that people are mumbling when theyre talking to you.    Find that the effort to hear leaves you feeling tired and irritated.    Notice, when using the phone, that you hear better with 1 ear than the other.    7866-2411 [employer]. [address] [place] [address]. All rights reserved. This information is not intended as a substitute for professional medical care. Always follow your healthcare professional's instructions.         Patient Education   Urinary Incontinence, Female (Adult)  Urinary incontinence means loss of control of the bladder. This problem affects many women, especially as they get older. If you have incontinence, you may be embarrassed to ask for help. But know that this problem can be treated.  Types of Incontinence  There are different types of incontinence. Two of the main types are described here. You can have more than one type.    Stress incontinence. With this type, urine leaks when pressure (stress) is put on the bladder. This may happen when you cough, sneeze, or laugh. Stress incontinence most often occurs because the pelvic floor muscles that support the bladder and urethra are weak. This can happen after pregnancy and vaginal childbirth or a hysterectomy. It can also be due to excess body weight or hormone changes.    Urge incontinence (also called overactive bladder). With this type, a sudden urge to urinate is felt often. This may happen even though there may not be much urine in the bladder. The need to urinate often during the night is common. Urge incontinence most often occurs because of bladder spasms. This may be due to bladder irritation or infection. Damage to bladder nerves or pelvic muscles, constipation, and certain medicines can also lead to urge incontinence.  Treatment of urinary incontinence depends on the cause. Further  evaluation is needed to find the type you have. This will likely include an exam and certain tests. Based on the results, you and your healthcare provider can then plan treatment. Until a diagnosis is made, the home care tips below can help relieve symptoms.  Home care    Do pelvic floor muscle exercises, if they are prescribed. The pelvic floor muscles help support the bladder and urethra. Many women find that their symptoms improve when doing special exercises that strengthen these muscles. To do the exercises contract the muscles you would use to stop your stream of urine, but do this when youre not urinating. Hold for 10 seconds, then relax. Repeat 10 to 20 times in a row, at least 3 times a day. Your provider may give you other instructions for how to do the exercises and how often.    Keep a bladder diary. This helps track how often and how much you urinate over a set period of time. Bring this diary with you to your next visit with the provider. The information can help your provider learn more about your bladder problem.    Lose weight, if advised to by your provider. Excess weight puts pressure on the bladder. Your provider can help you create a weight-loss plan thats right for you. This may include exercising more and making certain diet changes.    Don't consume foods and drinks that may irritate the bladder. These can include alcohol and caffeinated drinks.    Quit smoking. Smoking and other tobacco use can lead to chronic cough that strains the pelvic floor muscles. Smoking may also damage the bladder and urethra. Talk with your provider about treatments or methods you can use to quit smoking.    If drinking large amounts of fluid causes you to have symptoms, you may be advised to limit your fluid intake. You may also be advised to drink most of your fluids during the day and to limit fluids at night.    If youre worried about urine leakage or accidents, you may wear absorbent pads to catch  urine. Change the pads often. This helps reduce discomfort. It may also reduce the risk of skin or bladder infections.  Follow-up care  Follow up with your healthcare provider, or as directed. It may take some to find the right treatment for your problem. Your treatment plan may include special therapies or medicines. Certain procedures or surgery may also be options. Be sure to discuss any questions you have with your provider.  When to seek medical advice  Call the healthcare provider right away if any of these occur:    Fever of 100.4 F (38 C) or higher, or as directed by your provider    Bladder pain or fullness    Abdominal swelling    Nausea or vomiting    Back pain    Weakness, dizziness or fainting  Date Last Reviewed: 10/1/2017    [id number] [employer] [address] [place] [address], [place] [address]. All rights reserved. This information is not intended as a substitute for professional medical care. Always follow your healthcare professional's instructions.     Patient Education   Preventing Falls in the Home  As you get older, falls are more likely. Thats because your reaction time slows. Your muscles and joints may also get stiffer, making them less flexible. Illness, medications, and vision changes can also affect your balance. A fall could leave you unable to live on your own. To make your home safer, follow these tips:    Floors    Put nonskid pads under area rugs.    Remove throw rugs.    Replace worn floor coverings.    Tack carpets firmly to each step on carpeted stairs. Put nonskid strips on the edges of uncarpeted stairs.    Keep floors and stairs free of clutter and cords.    Arrange furniture so there are clear pathways.    Clean up any spills right away.    Bathrooms    Install grab bars in the tub or shower.    Apply nonskid strips or put a nonskid rubber mat in the tub or shower.    Sit on a bath chair to bathe.    Use bathmats with nonskid backing.    Lighting    Keep a flashlight  in each room.    Put a nightlight along the pathway between the bedroom and the bathroom.    3253-3600 [employer]. [address] [place] [address], [place] [address]. All rights reserved. This information is not intended as a substitute for professional medical care. Always follow your healthcare professional's instructions.         Patient Education   Understanding Advance Care Planning  Advance care planning is the process of deciding ones own future medical care. It helps ensure that if you cant speak for yourself, your wishes can still be carried out. The plan is a series of legal documents that note a persons wishes. The documents vary by state. Advance care planning may be done when a person has a serious illness that is expected to get worse. It may be done before major surgery. And it can help you and your family be prepared in case of a major illness or injury. Advance care planning helps with making decisions at these times.       A health care proxy is a person who acts as the voice of a patient when the patient cant speak for himself or herself. The name of this role varies by state. It may be called a Durable Medical Power of  or Durable Power of  for Healthcare. It may be called an agent, surrogate, or advocate. Or it may be called a representative or decision maker. It is an official duty that is identified by a legal document. The document also varies by state.    Why Is Advance Care Planning Important?  If a person communicates their healthcare wishes:    They will be given medical care that matches their values and goals.    Their family members will not be forced to make decisions in a crisis with no guidance.  Creating a Plan  Making an advance care plan is often done in 3 steps:    Thinking about ones wishes. To create an advance care plan, you should think about what kind of medical treatment you would want if you lose the ability to communicate. Are there any situations  in which you would refuse or stop treatment? Are there therapies you would want or not want? And whom do you want to make decisions for you? There are many places to learn more about how to plan for your care. Ask your doctor or  for resources.    Picking a health care proxy. This means choosing a trusted person to speak for you only when you cant speak for yourself. When you cannot make medical decisions, your proxy makes sure the instructions in your advance care plan are followed. A proxy does not make decisions based on his or her own opinions. They must put aside those opinions and values if needed, and carry out your wishes.    Filling out the legal documents. There are several kinds of legal documents for advance care planning. Each one tells health care providers your wishes. The documents may vary by state. They must be signed and may need to be witnessed or notarized. You can cancel or change them whenever you wish. Depending on your state, the documents may include a Healthcare Proxy form, Living Will, Durable Medical Power of , Advance Directive, or others.  The Familys Role  The best help a family can give is to support their loved ones wishes. Open and honest communication is vital. Family should express any concerns they have about the patients choices while the patient can still make decisions.    [id number] [employer]. [address] [place] [address], [place] [address]. All rights reserved. This information is not intended as a substitute for professional medical care. Always follow your healthcare professional's instructions.         Also, [employer] [place] offers a free, downloadable health care directive that allows you to share your treatment choices and personal preferences if you cannot communicate your wishes. It also allows you to appoint another person (called a health care agent) to make health care decisions if you are unable to do so. You can download  an advance directive by going here: [email or web address]     Patient Education   Personalized Prevention Plan  You are due for the preventive services outlined below.  Your care team is available to assist you in scheduling these services.  If you have already completed any of these items, please share that information with your care team to update in your medical record.  Health Maintenance   Topic Date Due   ? DXA SCAN  08/19/2017   ? ZOSTER VACCINES (2 of 2) 06/18/2018   ? FALL RISK ASSESSMENT  03/01/2019   ? MAMMOGRAM  06/08/2019   ? TD 18+ HE  04/26/2020   ? DEPRESSION FOLLOW UP  12/26/2019   ? ADVANCE DIRECTIVES DISCUSSED WITH PATIENT  12/07/2021   ? COLONOSCOPY  08/24/2025   ? PNEUMOCOCCAL POLYSACCHARIDE VACCINE AGE 65 AND OVER  Completed   ? INFLUENZA VACCINE RULE BASED  Completed   ? PNEUMOCOCCAL CONJUGATE VACCINE FOR ADULTS (PCV13 OR PREVNAR)  Completed

## 2021-06-18 NOTE — PATIENT INSTRUCTIONS - HE
Patient Instructions by Angelina Evans MD at 12/4/2020 10:15 AM     Author: Angelina Evans MD Service: -- Author Type: Physician    Filed: 12/4/2020 10:43 AM Encounter Date: 12/4/2020 Status: Addendum    : Angelina Evans MD (Physician)    Related Notes: Original Note by Angelina Evans MD (Physician) filed at 12/4/2020 10:35 AM       1. Start taking Fosamax once a week for osteoporosis. Take it with large glass of water.    2. Get tetanus booster, at the pharmacy    3. Schedule fasting blood work and mammogram    4. Try to loose weight (10 lbs).    5. Increase excersize      Patient Education     Exercise for a Healthier Heart  You may wonder how you can improve the health of your heart. If youre thinking about exercise, youre on the right track. You dont need to become an athlete, but you do need a certain amount of brisk exercise to help strengthen your heart. If you have been diagnosed with a heart condition, your doctor may recommend exercise to help stabilize your condition. To help make exercise a habit, choose safe, fun activities.       Be sure to check with your health care provider before starting an exercise program.    Why exercise?  Exercising regularly offers many healthy rewards. It can help you do all of the following:    Improve your blood cholesterol levels to help prevent further heart trouble    Lower your blood pressure to help prevent a stroke or heart attack    Control diabetes, or reduce your risk of getting this disease    Improve your heart and lung function    Reach and maintain a healthy weight    Make your muscles stronger and more limber so you can stay active    Prevent falls and fractures by slowing the loss of bone mass (osteoporosis)    Manage stress better  Exercise tips  Ease into your routine. Set small goals. Then build on them.  Exercise on most days. Aim for a total of 150 or more minutes of moderate to  vigorous intensity activity each week. Consider  40 minutes, 3 to 4 times a week. For best results, activity should last for 40 minutes on average. It is OK to work up to the 40 minute period over time. Examples of moderate-intensity activity is walking one mile in 15 minutes or 30 to 45 minutes of yard work.  Step up your daily activity level. Along with your exercise program, try being more active throughout the day. Walk instead of drive. Do more household tasks or yard work.  Choose one or more activities you enjoy. Walking is one of the easiest things you can do. You can also try swimming, riding a bike, or taking an exercise class.  Stop exercising and call your doctor if you:    Have chest pain or feel dizzy or lightheaded    Feel burning, tightness, pressure, or heaviness in your chest, neck, shoulders, back, or arms    Have unusual shortness of breath    Have increased joint or muscle pain    Have palpitations or an irregular heartbeat      1380-6014 The [employer]. [address] [place] [address]. All rights reserved. This information is not intended as a substitute for professional medical care. Always follow your healthcare professional's instructions.         Patient Education   Understanding USDA MyPlate  The USDA (US Department of Agriculture) has guidelines to help you make healthy food choices. These are called MyPlate. MyPlate shows the food groups that make up healthy meals using the image of a place setting. Before you eat, think about the healthiest choices for what to put onto your plate or into your cup or bowl. To learn more about building a healthy plate, visit www.choosemyplate.gov.       The Food Groups    Fruits: Any fruit or 100% fruit juice counts as part of the Fruit Group. Fruits may be fresh, canned, frozen, or dried, and may be whole, cut-up, or pureed. Make half your plate fruits and vegetables.    Vegetables: Any vegetable or 100% vegetable juice counts as a member of the Vegetable Group. Vegetables may be  fresh, frozen, canned, or dried. They can be served raw or cooked and may be whole, cut-up, or mashed. Make half your plate fruits and vegetables.     Grains: All foods made from grains are part of the Grains Group. These include wheat, rice, oats, cornmeal, and barley such as bread, pasta, oatmeal, cereal, tortillas, and grits. Grains should be no more than a quarter of your plate. At least half of your grains should be whole grains.    Protein: This group includes meat, poultry, seafood, beans and peas, eggs, processed soy products (like tofu), nuts (including nut butters), and seeds. Make protein choices no more than a quarter of your plate. Meat and poultry choices should be lean or low fat.    Dairy: All fluid milk products and foods made from milk that contain calcium, like yogurt and cheese are part of the Dairy Group. (Foods that have little calcium, such as cream, butter, and cream cheese, are not part of the group.) Most dairy choices should be low-fat or fat-free.    Oils: These are fats that are liquid at room temperature. They include canola, corn, olive, soybean, and sunflower oil. Foods that are mainly oil include mayonnaise, certain salad dressings, and soft margarines. You should have only 5 to 7 teaspoons of oils a day. You probably already get this much from the food you eat.  Use Lorena Gaxiolaer to Help Build Your Meals  The SuperTracker can help you plan and track your meals and activity. You can look up individual foods to see or compare their nutritional value. You can get guidelines for what and how much you should eat. You can compare your food choices. And you can assess personal physical activities and see ways you can improve. Go to www.RACTIVplate.gov/supertracker/.    9898-1772 The [employer]. [address] [place] [address], [place] [address]. All rights reserved. This information is not intended as a substitute for professional medical care. Always follow your healthcare  professional's instructions.           Patient Education   Signs of Hearing Loss  Hearing loss is a problem shared by many people. In fact, it is one of the most common health conditions, particularly as people age. Most people over age 65 have some hearing loss, and by age 80, almost everyone does. Because hearing loss usually occurs slowly over the years, you may not realize your hearing ability has gotten worse.       Have your hearing checked  Contact your [facility] care provider if you:    Have to strain to hear normal conversation.    Have to watch other peoples faces very carefully to follow what theyre saying.    Need to ask people to repeat what theyve said.    Often misunderstand what people are saying.    Turn the volume of the television or radio up so high that others complain.    Feel that people are mumbling when theyre talking to you.    Find that the effort to hear leaves you feeling tired and irritated.    Notice, when using the phone, that you hear better with 1 ear than the other.    5322-9108 The [employer]. [address] [place] [address]. All rights reserved. This information is not intended as a substitute for professional medical care. Always follow your healthcare professional's instructions.         Patient Education   Understanding Advance Care Planning  Advance care planning is the process of deciding ones own future medical care. It helps ensure that if you cant speak for yourself, your wishes can still be carried out. The plan is a series of legal documents that note a persons wishes. The documents vary by state. Advance care planning may be done when a person has a serious illness that is expected to get worse. It may be done before major surgery. And it can help you and your family be prepared in case of a major illness or injury. Advance care planning helps with making decisions at these times.       A health care proxy is a person who acts as the voice of a patient  when the patient cant speak for himself or herself. The name of this role varies by state. It may be called a Durable Medical Power of  or Durable Power of  for Healthcare. It may be called an agent, surrogate, or advocate. Or it may be called a representative or decision maker. It is an official duty that is identified by a legal document. The document also varies by state.    Why Is Advance Care Planning Important?  If a person communicates their healthcare wishes:    They will be given medical care that matches their values and goals.    Their family members will not be forced to make decisions in a crisis with no guidance.  Creating a Plan  Making an advance care plan is often done in 3 steps:    Thinking about ones wishes. To create an advance care plan, you should think about what kind of medical treatment you would want if you lose the ability to communicate. Are there any situations in which you would refuse or stop treatment? Are there therapies you would want or not want? And whom do you want to make decisions for you? There are many places to learn more about how to plan for your care. Ask your doctor or  for resources.    Picking a health care proxy. This means choosing a trusted person to speak for you only when you cant speak for yourself. When you cannot make medical decisions, your proxy makes sure the instructions in your advance care plan are followed. A proxy does not make decisions based on his or her own opinions. They must put aside those opinions and values if needed, and carry out your wishes.    Filling out the legal documents. There are several kinds of legal documents for advance care planning. Each one tells health care providers your wishes. The documents may vary by state. They must be signed and may need to be witnessed or notarized. You can cancel or change them whenever you wish. Depending on your state, the documents may include a Healthcare Proxy form,  Living Will, Durable Medical Power of , Advance Directive, or others.  The Familys Role  The best help a family can give is to support their loved ones wishes. Open and honest communication is vital. Family should express any concerns they have about the patients choices while the patient can still make decisions.    1656-5221 The [employer]. [address] [place] [address]. All rights reserved. This information is not intended as a substitute for professional medical care. Always follow your healthcare professional's instructions.         Also, [facility] [employer] [place] offers a free, downloadable health care directive that allows you to share your treatment choices and personal preferences if you cannot communicate your wishes. It also allows you to appoint another person (called a health care agent) to make health care decisions if you are unable to do so. You can download an advance directive by going here: [email or web address]     Patient Education   Personalized Prevention Plan  You are due for the preventive services outlined below.  Your care team is available to assist you in scheduling these services.  If you have already completed any of these items, please share that information with your care team to update in your medical record.  Health Maintenance   Topic Date Due   ? HEPATITIS C SCREENING  1950   ? ZOSTER VACCINES (2 of 2) 06/18/2018   ? TD 18+ HE  04/26/2020   ? MAMMOGRAM  07/05/2020   ? MEDICARE ANNUAL WELLNESS VISIT  12/04/2021   ? FALL RISK ASSESSMENT  12/04/2021   ? ADVANCE CARE PLANNING  12/07/2021   ? LIPID  12/20/2024   ? COLORECTAL CANCER SCREENING  08/24/2025   ? DEXA SCAN  09/11/2034   ? DEPRESSION ACTION PLAN  Completed   ? Pneumococcal Vaccine: 65+ Years  Completed   ? INFLUENZA VACCINE RULE BASED  Completed   ? Pneumococcal Vaccine: Pediatrics (0 to 5 Years) and At-Risk Patients (6 to 64 Years)  Aged Out   ? HEPATITIS B  VACCINES  Aged Out

## 2021-06-18 NOTE — PROGRESS NOTES
[facility] Follow Up Note    Assessment/Plan:    1. Injury of nail bed of toe  This is a recurrent injury.  In the past when the same thing happened patient developed ingrown nail.  I recommended that she sees a podiatrist.  - Ambulatory referral to Podiatry    2. Encounter for screening mammogram for breast cancer  Patient has family history of breast cancer.  She should have annual mammograms done.  Referral was provided.  - Mammo Screening Bilateral; Future    Angelina Evans MD    Chief Complaint:  Chief Complaint   Patient presents with     Toe Injury     partially broke toenail (L) great toe       History of Present Illness:  Verónica is a 68 y.o. female with history of depression and anxiety and problems with concentration, history of fibromyalgia, migraines, osteoporosis, IBS, environmental allergies, rosacea, hyperlipidemia, family history of breast cancer and cholecystectomy she is currently here for acute visit due to right big toenail injury.    Patient stopped her toe and it became partially detached.  This is not the first time it has happened.  In the past the other side of the toe got detached and when she did not do anything she developed ingrown toenail which required surgery.  Currently it appears that half of her nail is detached and the nailbed might be affected.  Patient is using ice for pain control and declined any need for stronger pain medication.  Currently we discussed that she will need to see a podiatrist.    Patient has family history of breast cancer and had benign lumpectomies in the past.  Discussed that she is due for repeat mammogram this year.  She also has osteoporosis but is refusing treatment for that.  Recent vitamin D level was normal.    Review of Systems:  A comprehensive review of systems was performed and was otherwise negative and she denies depressive symptoms.  PHQ 9 today was 13 but most of the scoring is from changes in appetite, energy and  sleep.    PFSH:  Social History: Reviewed  History   Smoking Status     Never Smoker   Smokeless Tobacco     Never Used     Social History     Social History Narrative    Patient is currently in her second marriage and has been  for 14 years.  Her daughter Ira Cardona is a patient of mine as well and they live together.  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  She wants to go back to work in the near future.       Past History: Reviewed  Current Outpatient Prescriptions   Medication Sig Dispense Refill     5-hydroxytryptophan 50 mg cap Take 100 mg by mouth at bedtime. One tab in the morning and one tab in afternoon       ammonium lactate (AMLACTIN) 12 % cream Apply topically as needed for dry skin. 385 g 3     CALCIUM CARBONATE (CALCIUM 500 ORAL) Take 2,000 mg by mouth.       cholecalciferol, vitamin D3, (VITAMIN D3) 2,000 unit cap Take by mouth.       doxycycline (PERIOSTAT) 20 MG tablet Take 1 tablet (20 mg total) by mouth 2 (two) times a day. 60 tablet 3     GREEN TEA LEAF EXTRACT (GREEN TEA ORAL) Take by mouth as needed.        hypochlorous acid-sodium chlor (AVENOVA) 0.01 % Spry Apply 2 sprays topically daily. Once every dayApply topically. 38 mL 4     LOTEMAX 0.5 % DrpG Administer 1 drop to both eyes 2 (two) times a day as needed. 5 g 0     metroNIDAZOLE (METROCREAM) 0.75 % cream Apply twice a day 45 g 3     PROPYLENE GLYCOL/ (SYSTANE OPHT) Apply to eye as needed. OTC       UNABLE TO FIND Med Name: L-Theanine       [START ON 7/1/2018] varicella-zoster [id number], PF, 50 mcg/0.5 mL SusR Inject 50 mcg into the shoulder, thigh, or buttocks once for 1 dose. 1 each 0     No current facility-administered medications for this visit.        Family History: Reviewed    Physical Exam:    Vitals:    05/14/18 1422   BP: 118/70   Patient Site: Left Arm   Patient Position: Sitting   Cuff Size: Adult Regular   Pulse: 76   Resp: 16   SpO2: 98%   Weight: 169 lb 3 oz (76.7  "kg)   Height: 5' 7\" (1.702 m)     Wt Readings from Last 3 Encounters:   05/14/18 169 lb 3 oz (76.7 kg)   03/01/18 168 lb 7 oz (76.4 kg)   05/18/17 157 lb 7 oz (71.4 kg)     Body mass index is 26.5 kg/(m^2).    Constitutional:  Reveals a pleasant female.  Vitals:  Per nursing notes.  HEENT:No cervical LAD, no thyromegaly,  conjunctiva is slightly injected, no drainage, no scleral icterus,oropharynx is clear, no exudates,   Cardiac:  Regular rate and rhythm,no murmurs, rubs, or gallops. Legs without edema. Palpation of the radial pulse regular.  Lungs: Clear to auscultation bl.  Respiratory effort normal.  Abdomen:positive BS, soft, nontender, nondistended.  No hepato-splenomagaly  Psychiatric: affect appropriate, memory intact.  Mild anxiety noted.  Breast exam: No axilla lymphadenopathy, breast masses or skin changes appreciated, breast tissue is fibrocystic.    Data Review:    Analysis and Summary of Old Records (2): Yes    Records Requested (1): *No    Other History Summarized (from other people in the room) (2): No    Radiology Tests Summarized (XRAY/CT/MRI/DXA) (1): Yes mammogram    Labs Reviewed (1): Yes    Medicine Tests Reviewed (EKG/ECHO/COLONOSCOPY/EGD) (1): No    Independent Review of EKG or X-RAY (2): No    "

## 2021-06-19 NOTE — LETTER
Letter by Angelina Evans MD at      Author: Angelina Evans MD Service: -- Author Type: --    Filed:  Encounter Date: 10/1/2019 Status: Signed         Verónica Boles  [address]  [place] [address]             October 1, 2019         Dear Ms. RadhaDelvis,    Below are the results from your recent visit:    Luiza,    Bone density test shows severe osteoporosis in the spine and moderate at the him with significant worsening since last bone density test.    You should start on osteoporosis medication. Please schedule appt if would like to discuss options. Or e can just start you on Fosamax.    Also have fasting blood work done which I ordered in June. It has vitamin D levels in it too.  Resulted Orders   DXA Bone Density Scan    Narrative    9/11/2019      RE: Verónica Boles  YOB: 1950        Dear Angelina Evans,    Patient Profile:  69 y.o. female, postmenopausal, is here for the follow up bone density   test.   History of fractures - None. Family history of osteoporosis - Yes;    grandparent.  Family history of hip fracture: None. Smoking history - No.   Osteoporosis treatment past -  Yes;  HRT. Osteoporosis treatment current -   No.  Chronic medical problems - Chronic low back problems and Rheumatoid   arthritis. High risk medications -  None.    Assessment:    1. The spine bone density is best assessed using L1-L2 with T-score of   -4.1.  At this site, bone density is lower than age expected with a Z   score of -2.8.  2. Femoral bone densities show left total hip T- score of -2.4, and right   total hip T-score of -2.8.  3.  Since the scan in 2010, bone density is decreased a significant 12.3%   at L1-L2.  Since the scan in 2015, bone density has declined a significant   3.7% in the right total hip.  In the left total hip, bone density has   remained essentially stable    69 y.o. female with OSTEOPOROSIS and HIGH predicted future fracture risk.          Recommendations:  Further evaluation and therapeutic intervention is advised with a recheck   in 1 year.      Bone densitometry was performed on your patient using our [employer]off iDXA   densitometer. The results are summarized and a copy of the actual scans   are included for your review. In conformity with the International Society   of Clinical Densitometry's most recent position statement for DXA   interpretation (2015), the diagnosis will be made on the lowest measured   T-score of the lumbar spine, femoral neck, total proximal femur or 33%   radius. Note the change in terminology for diagnostic classification from   OSTEOPENIA to LOW BONE MASS. All trending for sequential exams will be   done using multiple vertebrae or the total proximal femur. Fracture risk   is based on the WHO Fracture Risk Assessment Tool (FRAX). If additional   information is needed or if you would like to discuss the results, please   do not hesitate to call me.       Thank you for referring this patient to [facility].   We are happy to be of service in support of you and your practice. If you   have any questions or suggestions to improve our service, please call me   at [phone].     Sincerely,     Rober Quick M.D. C.C.D.  Osteoporosis Services, [facility]         Please call with questions or contact us using [employer].    Sincerely,    Electronically signed by Angelina Evans MD

## 2021-06-20 NOTE — LETTER
Letter by Angelina Evans MD at      Author: Angelina Evans MD Service: -- Author Type: --    Filed:  Encounter Date: 12/23/2019 Status: Signed         Verónica Boles  [address]  [place] [address]             December 23, 2019         Dear Ms. Boles,    Below are the results from your recent visit:    Parathyroid hormone is normal.  We checked it because of severe osteoporosis.  So you do not have parathyroid disease.    Vitamin D level is in good range.  However since he recently started taking higher dose (4000 units twice a day), I would recommend decreasing it to just 4000 units a day.    Triglycerides are elevated because you were not fasting however LDL cholesterol which is not affected by fasting or not is moderately elevated.  Try to decrease amount of red meat and shrimp if eating any.    Kidney, liver thyroid functions were normal.  Resulted Orders   Parathyroid Hormone Intact   Result Value Ref Range    PTH 31 10 - 86 pg/mL   Lipid Cascade   Result Value Ref Range    Cholesterol 272 (H) <=199 mg/dL    Triglycerides 202 (H) <=149 mg/dL    HDL Cholesterol 77 >=50 mg/dL    LDL Calculated 155 (H) <=129 mg/dL    Patient Fasting > 8hrs? No    Comprehensive Metabolic Panel   Result Value Ref Range    Sodium 142 136 - 145 mmol/L    Potassium 4.0 3.5 - 5.0 mmol/L    Chloride 107 98 - 107 mmol/L    CO2 25 22 - 31 mmol/L    Anion Gap, Calculation 10 5 - 18 mmol/L    Glucose 99 70 - 125 mg/dL    BUN 18 8 - 22 mg/dL    Creatinine 0.83 0.60 - 1.10 mg/dL    GFR MDRD Af Amer >60 >60 mL/min/1.73m2    GFR MDRD Non Af Amer >60 >60 mL/min/1.73m2    Bilirubin, Total 0.5 0.0 - 1.0 mg/dL    Calcium 9.8 8.5 - 10.5 mg/dL    Protein, Total 7.2 6.0 - 8.0 g/dL    Albumin 3.9 3.5 - 5.0 g/dL    Alkaline Phosphatase 70 45 - 120 U/L    AST 23 0 - 40 U/L    ALT 20 0 - 45 U/L    Narrative    Fasting Glucose reference range is 70-99 mg/dL per  American Diabetes Association (ADA) guidelines.   HM2(CBC w/o  Differential)   Result Value Ref Range    WBC 6.2 4.0 - 11.0 thou/uL    RBC 4.67 3.80 - 5.40 mill/uL    Hemoglobin 15.7 12.0 - 16.0 g/dL    Hematocrit 47.7 (H) 35.0 - 47.0 %     (H) 80 - 100 fL    MCH 33.6 27.0 - 34.0 pg    MCHC 32.9 32.0 - 36.0 g/dL    RDW 10.2 (L) 11.0 - 14.5 %    Platelets 329 140 - 440 thou/uL    MPV 6.7 (L) 7.0 - 10.0 fL   Thyroid Stimulating Hormone (TSH)   Result Value Ref Range    TSH 2.13 0.30 - 5.00 uIU/mL   Vitamin D, Total (25-Hydroxy)   Result Value Ref Range    Vitamin D, Total (25-Hydroxy) 37.6 30.0 - 80.0 ng/mL    Narrative    Deficiency <10.0 ng/mL  Insufficiency 10.0-29.9 ng/mL  Sufficiency 30.0-80.0 ng/mL  Toxicity (possible) >100.0 ng/mL       Please call with questions or contact us using [employer].    Sincerely,        Electronically signed by Angelina Evans MD

## 2021-06-20 NOTE — PROGRESS NOTES
[facility] Follow Up Note    Verónica Boles   68 y.o. female    Date of Visit: 8/28/2018    Chief Complaint   Patient presents with     rib pain     left side     Subjective  Verónica has chronic irritable bowel syndrome, chronic fatigue syndrome and fibromyalgia along with major depression and is a patient of Dr. Rashida Evans's and comes in today due to pain under her left breast.  She been experiencing chronic pain on the right side for 5-1/2 years and had regularly been seeing a chiropractor and a few days ago had an adjustment done and the chiropractor did something with a lot of force her to cracking sound and the right-sided rib pain was relieved for the first time.  2 days ago she was slicing a melon at home and return to the sink and felt a sudden onset of pain in her left side of her ribs when she went to put dishes away.  She has pain that is worse when taking a deep breath or pushing on the area.  There is no rash.  She has been applying ice alternating with heat with minimal relief.  She would like to see a doctor of osteopathy to fix this.    ROS A comprehensive review of systems was performed and was otherwise negative.    Social History     Social History Narrative    Patient is currently in her second marriage and has been  for 14 years.  Her daughter Ira Cardona is a patient of mine as well and they live together.  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  She wants to go back to work in the near future.       Medications were reconciled.  Allergies, social and family history, and the problem list were all reviewed and updated.    Exam  General Appearance: Pleasant and alert   Vitals:    08/28/18 1305   BP: 116/74   Patient Site: Left Arm   Patient Position: Sitting   Cuff Size: Adult Regular   Pulse: 68   SpO2: 98%   Weight: 171 lb 9 oz (77.8 kg)      Body mass index is 26.87 kg/(m^2).  Wt Readings from Last 3 Encounters:   08/28/18  171 lb 9 oz (77.8 kg)   05/14/18 169 lb 3 oz (76.7 kg)   03/01/18 168 lb 7 oz (76.4 kg)     HEENT: Sclera are clear.   Lungs: Normal respirations, clear to auscultation  Cardiac: Regular rate and rhythm   Abdomen: Soft and nondistended  Extremities: No edema  Skin: No rashes  Neuro: Moves all extremities and has facial symmetry  Gait: Ambulates with a normal gait    Chest x-ray shows: No obvious fractures or infiltrates    Assessment/Plan  1. Chest wall pain  May have some costochondritis.  She declines any medications.  She does not wish to do any further testing but is hoping to see a doctor of osteopathy to help her fix this.  - XR Chest 2 Views  - Ambulatory referral to Chiropractic          Siobhan Montoya MD  Internal and Geriatric Medicine  [facility]    Much or all of the text in this note was generated through the use of Dragon Dictate voice-to-text software. Errors in spelling or words which seem out of context are unintentional. Sound alike errors, in particular, may have escaped editing.
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
## 2021-06-21 NOTE — LETTER
Letter by Angelina Evans MD at      Author: Angelina Evans MD Service: -- Author Type: --    Filed:  Encounter Date: 12/22/2020 Status: (Other)         Verónica Boles  [address]  [place] [address]             December 22, 2020         Dear Ms. Boles,    Below are the results from your recent visit:  Hepatitis C screen is negative.  Kidney, liver, thyroid functions, sugar ans red cell counts are normal.  Vitamin D level is good, continue current supplement.    LDL cholesterol is very high and up from 154 to 185. Please cut back on red meat, egg yokes, shrimp and butter. We should repeat fasting cholesterol again in 6 months, if still elevated , I would recommend cholesterol lowering medication.    Resulted Orders   Hepatitis C Antibody (Anti-HCV)   Result Value Ref Range    Hepatitis C Ab Negative Negative   Comprehensive Metabolic Panel   Result Value Ref Range    Sodium 141 136 - 145 mmol/L    Potassium 4.1 3.5 - 5.0 mmol/L    Chloride 104 98 - 107 mmol/L    CO2 24 22 - 31 mmol/L    Anion Gap, Calculation 13 5 - 18 mmol/L    Glucose 103 70 - 125 mg/dL    BUN 10 8 - 22 mg/dL    Creatinine 0.86 0.60 - 1.10 mg/dL    GFR MDRD Af Amer >60 >60 mL/min/1.73m2    GFR MDRD Non Af Amer >60 >60 mL/min/1.73m2    Bilirubin, Total 0.9 0.0 - 1.0 mg/dL    Calcium 9.4 8.5 - 10.5 mg/dL    Protein, Total 7.4 6.0 - 8.0 g/dL    Albumin 4.3 3.5 - 5.0 g/dL    Alkaline Phosphatase 83 45 - 120 U/L    AST 24 0 - 40 U/L    ALT 17 0 - 45 U/L    Narrative    Fasting Glucose reference range is 70-99 mg/dL per  American Diabetes Association (ADA) guidelines.   Lipid Cascade   Result Value Ref Range    Cholesterol 278 (H) <=199 mg/dL    Triglycerides 91 <=149 mg/dL    HDL Cholesterol 76 >=50 mg/dL    LDL Calculated 184 (H) <=129 mg/dL    Patient Fasting > 8hrs? Yes    Thyroid Stimulating Hormone (TSH)   Result Value Ref Range    TSH 4.06 0.30 - 5.00 uIU/mL   Vitamin D, Total (25-Hydroxy)   Result Value Ref Range     Vitamin D, Total (25-Hydroxy) 45.6 30.0 - 80.0 ng/mL    Narrative    Deficiency <10.0 ng/mL  Insufficiency 10.0-29.9 ng/mL  Sufficiency 30.0-80.0 ng/mL  Toxicity (possible) >100.0 ng/mL   HM1 (CBC with Diff)   Result Value Ref Range    WBC 5.7 4.0 - 11.0 thou/uL    RBC 4.96 3.80 - 5.40 mill/uL    Hemoglobin 16.1 (H) 12.0 - 16.0 g/dL    Hematocrit 48.5 (H) 35.0 - 47.0 %    MCV 98 80 - 100 fL    MCH 32.5 27.0 - 34.0 pg    MCHC 33.2 32.0 - 36.0 g/dL    RDW 12.6 11.0 - 14.5 %    Platelets 309 140 - 440 thou/uL    MPV 9.4 8.5 - 12.5 fL    Neutrophils % 54 50 - 70 %    Lymphocytes % 35 20 - 40 %    Monocytes % 8 2 - 10 %    Eosinophils % 2 0 - 6 %    Basophils % 1 0 - 2 %    Immature Granulocyte % 0 <=0 %    Neutrophils Absolute 3.1 2.0 - 7.7 thou/uL    Lymphocytes Absolute 2.0 0.8 - 4.4 thou/uL    Monocytes Absolute 0.5 0.0 - 0.9 thou/uL    Eosinophils Absolute 0.1 0.0 - 0.4 thou/uL    Basophils Absolute 0.1 0.0 - 0.2 thou/uL    Immature Granulocyte Absolute 0.0 <=0.0 thou/uL       Please call with questions or contact us using [employer].    Sincerely,    Electronically signed by Angelina Evans MD
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
## 2021-07-03 NOTE — ADDENDUM NOTE
Addendum Note by Aroldo Evans MD at 4/19/2017  6:08 PM     Author: Aroldo Evans MD Service: -- Author Type: Physician    Filed: 4/19/2017  6:08 PM Encounter Date: 4/19/2017 Status: Signed    : Aroldo Evans MD (Physician)    Addended by: AROLDO EVANS on: 4/19/2017 06:08 PM        Modules accepted: Orders

## 2021-07-03 NOTE — ADDENDUM NOTE
Addendum Note by Patience Myrick CMA at 5/3/2017  2:50 PM     Author: Patience Myrick CMA Service: -- Author Type: Certified Medical Assistant    Filed: 5/3/2017  2:50 PM Encounter Date: 5/3/2017 Status: Signed    : Patience Myrick CMA (Certified Medical Assistant)    Addended by: PATIENCE MYRICK on: 5/3/2017 02:50 PM        Modules accepted: Orders

## 2021-07-03 NOTE — ADDENDUM NOTE
Addendum Note by Aroldo Evans MD at 7/11/2019  9:56 AM     Author: Aroldo Evans MD Service: -- Author Type: Physician    Filed: 7/11/2019  9:56 AM Encounter Date: 7/10/2019 Status: Signed    : Aroldo Evans MD (Physician)    Addended by: AROLDO EVANS on: 7/11/2019 09:56 AM        Modules accepted: Orders

## 2021-08-16 ENCOUNTER — TELEPHONE (OUTPATIENT)
Dept: INTERNAL MEDICINE | Facility: CLINIC | Age: 71
End: 2021-08-16

## 2021-08-16 DIAGNOSIS — M81.0 SENILE OSTEOPOROSIS: Primary | ICD-10-CM

## 2021-08-16 DIAGNOSIS — G89.29 OTHER CHRONIC BACK PAIN: ICD-10-CM

## 2021-08-16 DIAGNOSIS — M54.89 OTHER CHRONIC BACK PAIN: ICD-10-CM

## 2021-08-16 NOTE — LETTER
To whom it may concern.    Verónica Elvi is under my care. She suffers from significant osteoporosis and chronic  pain. She has had good therapeutic results with [facility]. Due to mental   health issues, she is unable to go to a new PT clinic. Please authorize her repeat treatment   at [facility] due to medical necessity.    Dr Evans

## 2021-08-16 NOTE — TELEPHONE ENCOUNTER
"Reason for Call:  Other letter and order    Detailed comments: Pt is wishing to go to [facility].  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  She has social anxiety and can walk there and has a relationship with the physical therapist.    Need order and letter as her insurance doesn't want her to go there.  Letter needs to state that no one else can help her there.  Letter needs to be \"strong handed that she can only go there\"    Mail them out to pt once completed-and please let her know when it is on its way.      Phone Number Patient can be reached at: Cell number on file:    Telephone Information:   Mobile [phone]       Best Time: after 11am     Can we leave a detailed message on this number? NO-cant hear msgs    Call taken on 8/16/2021 at 4:37 PM by Pam J. Behr    "

## 2021-09-10 NOTE — TELEPHONE ENCOUNTER
RECORDS RECEIVED FROM: wart? on bottom of (R) foot / self referred / Medicare & [facility]   DATE RECEIVED: Oct 29, 2021     NOTES STATUS DETAILS   OFFICE NOTE from referring provider Internal    OFFICE NOTE from other specialist N/A    DISCHARGE SUMMARY from hospital N/A    DISCHARGE REPORT from the ER N/A    OPERATIVE REPORT N/A    MEDICATION LIST Internal    IMPLANT RECORD/STICKER N/A    LABS     CBC/DIFF N/A    CULTURES N/A    INJECTIONS DONE IN RADIOLOGY N/A    MRI N/A    CT SCAN N/A    XRAYS (IMAGES & REPORTS) N/A    TUMOR     PATHOLOGY  Slides & report N/A

## 2021-09-25 ENCOUNTER — HEALTH MAINTENANCE LETTER (OUTPATIENT)
Age: 71
End: 2021-09-25

## 2021-10-29 ENCOUNTER — OFFICE VISIT (OUTPATIENT)
Dept: ORTHOPEDICS | Facility: CLINIC | Age: 71
End: 2021-10-29
Payer: COMMERCIAL

## 2021-10-29 ENCOUNTER — PRE VISIT (OUTPATIENT)
Dept: ORTHOPEDICS | Facility: CLINIC | Age: 71
End: 2021-10-29

## 2021-10-29 VITALS — WEIGHT: 159 LBS | BODY MASS INDEX: 25.4 KG/M2

## 2021-10-29 DIAGNOSIS — B35.1 OM (ONYCHOMYCOSIS): Primary | ICD-10-CM

## 2021-10-29 DIAGNOSIS — B07.0 VERRUCA PLANTARIS: ICD-10-CM

## 2021-10-29 PROCEDURE — 99212 OFFICE O/P EST SF 10 MIN: CPT | Mod: 25 | Performed by: PODIATRIST

## 2021-10-29 PROCEDURE — 17110 DESTRUCTION B9 LES UP TO 14: CPT | Mod: TA | Performed by: PODIATRIST

## 2021-10-29 RX ORDER — EFINACONAZOLE 100 MG/ML
SOLUTION TOPICAL DAILY
Qty: 4 ML | Refills: 11 | Status: SHIPPED | OUTPATIENT
Start: 2021-10-29 | End: 2022-02-28

## 2021-10-29 RX ORDER — DOXYCYCLINE HYCLATE 20 MG
TABLET ORAL
COMMUNITY
Start: 2021-03-11 | End: 2022-02-28 | Stop reason: ALTCHOICE

## 2021-10-29 RX ORDER — ATORVASTATIN CALCIUM 20 MG/1
20 TABLET, FILM COATED ORAL
COMMUNITY
Start: 2021-01-04 | End: 2022-02-10

## 2021-10-29 RX ORDER — AMITRIPTYLINE HYDROCHLORIDE 10 MG/1
TABLET ORAL
COMMUNITY
Start: 2021-10-10 | End: 2022-02-02

## 2021-10-29 RX ORDER — FLUTICASONE PROPIONATE 50 MCG
SPRAY, SUSPENSION (ML) NASAL
COMMUNITY
Start: 2021-02-20 | End: 2022-07-07

## 2021-10-29 RX ORDER — ABALOPARATIDE 2000 UG/ML
INJECTION, SOLUTION SUBCUTANEOUS
COMMUNITY
Start: 2021-10-04 | End: 2022-05-13

## 2021-10-29 NOTE — NURSING NOTE
Reason For Visit:   Chief Complaint   Patient presents with     Consult     bump on plantar surface of right foot        Wt 72.1 kg (159 lb)   BMI 25.40 kg/m           Jelly Ann, ATC

## 2021-10-29 NOTE — PROGRESS NOTES
Chief Complaint:   Chief Complaint   Patient presents with     Consult     bump on plantar surface of right foot           Allergies   Allergen Reactions     Cat Dander [Animal Dander] Unknown     Mite-Dermatophagoides Pteronyssinus, Standardized [Dust Mite Extract] Unknown     Venom-Honey Bee [Bee Venom] Unknown         Subjective: Verónica is a 71 year old female who presents to the clinic today for a follow up of wart on the bottom of the right foot fungal nails on the left foot.  She relates that when she starts running, she has pain in the bottom of the right foot.  She relates that her thickening of the nails on the left foot on the big toe and the fifth toe.  She would like to know if they are fungal and if they are, if they can be treated topically.      Objective  Data Unavailable Data Unavailable Data Unavailable Data Unavailable Data Unavailable 159 lbs 0 oz  DP PT pulses are 2/4.  Capillary fill time is instant.  Positive pedal hair.  Gross sensation is intact.  Equinus is noted bilaterally.  Onychomycosis noted to the left hallux and fifth digits.  There is a hyperkeratotic lesion to the plantar surface of the right foot at the fourth metatarsal head.  This was sharply debrided and exhibited pain with lateral compression, pinpoint bleeding, and loss of skin tension lines.    Assessment: Onychomycosis of the left foot and verruca plantaris of the right foot.  I discussed treatment options with her.  She does not want to have any oral treatment for the left foot.  I recommend she use Jublia.  This was sent to the pharmacy.  For verruca, she would like liquid nitrogen treatments.    Plan:   - Pt seen and evaluated  - Liquid nitrogen was applied for 10-12 seconds to the skin lesion and the expected blistering or scabbing reaction explained. Do not pick at the area. Patient reminded to expect hypopigmented scars from the procedure. Return if lesion fails to fully resolve.  Recommended she start using Mediplast  on the area starting on Monday.  -Prescription sent for Santos.   - Pt to return to clinic in 1 month.

## 2021-10-29 NOTE — LETTER
10/29/2021         RE: Verónica Boles  [address]  [place] [address]        Dear Colleague,    Thank you for referring your patient, Verónica Boles, to the [facility] [place]. Please see a copy of my visit note below.    Chief Complaint:   Chief Complaint   Patient presents with     Consult     bump on plantar surface of right foot           Allergies   Allergen Reactions     Cat Dander [Animal Dander] Unknown     Mite-Dermatophagoides Pteronyssinus, Standardized [Dust Mite Extract] Unknown     Venom-Honey Bee [Bee Venom] Unknown         Subjective: Verónica is a 71 year old female who presents to the clinic today for a follow up of wart on the bottom of the right foot fungal nails on the left foot.  She relates that when she starts running, she has pain in the bottom of the right foot.  She relates that her thickening of the nails on the left foot on the big toe and the fifth toe.  She would like to know if they are fungal and if they are, if they can be treated topically.      Objective  Data Unavailable Data Unavailable Data Unavailable Data Unavailable Data Unavailable 159 lbs 0 oz  DP PT pulses are 2/4.  Capillary fill time is instant.  Positive pedal hair.  Gross sensation is intact.  Equinus is noted bilaterally.  Onychomycosis noted to the left hallux and fifth digits.  There is a hyperkeratotic lesion to the plantar surface of the right foot at the fourth metatarsal head.  This was sharply debrided and exhibited pain with lateral compression, pinpoint bleeding, and loss of skin tension lines.    Assessment: Onychomycosis of the left foot and verruca plantaris of the right foot.  I discussed treatment options with her.  She does not want to have any oral treatment for the left foot.  I recommend she use Jublia.  This was sent to the pharmacy.  For verruca, she would like liquid nitrogen treatments.    Plan:   - Pt seen and evaluated  - Liquid nitrogen was  applied for 10-12 seconds to the skin lesion and the expected blistering or scabbing reaction explained. Do not pick at the area. Patient reminded to expect hypopigmented scars from the procedure. Return if lesion fails to fully resolve.  Recommended she start using Mediplast on the area starting on Monday.  -Prescription sent for Santos.   - Pt to return to clinic in 1 month.    Fermin Alvarez DPM

## 2021-11-17 ENCOUNTER — TRANSFERRED RECORDS (OUTPATIENT)
Dept: HEALTH INFORMATION MANAGEMENT | Facility: CLINIC | Age: 71
End: 2021-11-17
Payer: COMMERCIAL

## 2021-11-18 ENCOUNTER — MYC MEDICAL ADVICE (OUTPATIENT)
Dept: INTERNAL MEDICINE | Facility: CLINIC | Age: 71
End: 2021-11-18
Payer: COMMERCIAL

## 2021-11-30 ENCOUNTER — TELEPHONE (OUTPATIENT)
Dept: INTERNAL MEDICINE | Facility: CLINIC | Age: 71
End: 2021-11-30

## 2021-11-30 NOTE — TELEPHONE ENCOUNTER
Reason for Call: Request for an order or referral:    Order or referral being requested:   Physical therapy order  [facility] eval and treat ([facility])    Date needed: as soon as possible    Has the patient been seen by the PCP for this problem? YES    Additional comments: n/a    Phone number Patient can be reached at:  Home number on file [phone] (home)    Best Time:  anytime    Can we leave a detailed message on this number?  YES    Call taken on 11/30/2021 at 12:36 PM by Piedad Wilkerson

## 2021-12-03 ENCOUNTER — OFFICE VISIT (OUTPATIENT)
Dept: ORTHOPEDICS | Facility: CLINIC | Age: 71
End: 2021-12-03
Payer: COMMERCIAL

## 2021-12-03 DIAGNOSIS — B35.1 OM (ONYCHOMYCOSIS): Primary | ICD-10-CM

## 2021-12-03 DIAGNOSIS — B07.0 VERRUCA PLANTARIS: ICD-10-CM

## 2021-12-03 PROCEDURE — 17110 DESTRUCTION B9 LES UP TO 14: CPT | Performed by: PODIATRIST

## 2021-12-03 PROCEDURE — 99213 OFFICE O/P EST LOW 20 MIN: CPT | Mod: 25 | Performed by: PODIATRIST

## 2021-12-03 NOTE — LETTER
12/3/2021         RE: Verónica Boles  [address]  [place] [address]        Dear Colleague,    Thank you for referring your patient, Verónica Boles, to the [facility] [place]. Please see a copy of my visit note below.    Chief Complaint:   Chief Complaint   Patient presents with     Follow Up     1 month follow up. Plantar wart, right.           Allergies   Allergen Reactions     Cat Dander [Animal Dander] Unknown     Mite-Dermatophagoides Pteronyssinus, Standardized [Dust Mite Extract] Unknown     Venom-Honey Bee [Bee Venom] Unknown         Subjective: Verónica is a 71 year old female who presents to the clinic today for a follow up of wart on the bottom of the right foot.  She has been using the Mediplast on the area.  She has been using the Jublia on the nails.    Objective  Data Unavailable Data Unavailable Data Unavailable Data Unavailable Data Unavailable 0 lbs 0 oz  DP PT pulses are 2/4.  Capillary fill time is instant.  Positive pedal hair.  Gross sensation is intact.  Equinus is noted bilaterally.  Onychomycosis noted to the left hallux and fifth digits.  There is a hyperkeratotic lesion to the plantar surface of the right foot at the fourth metatarsal head.  This was sharply debrided and exhibited pain with lateral compression, pinpoint bleeding, and loss of skin tension lines.    Assessment: Onychomycosis of the left foot and verruca plantaris of the right foot.  I discussed treatment options with her.  She does not want to have any oral treatment for the left foot.  I recommend she use Jublia.  This was sent to the pharmacy.  For verruca, she would like liquid nitrogen treatments.    Plan:   - Pt seen and evaluated  - Liquid nitrogen was applied for 10-12 seconds to the skin lesion and the expected blistering or scabbing reaction explained. Do not pick at the area. Patient reminded to expect hypopigmented scars from the procedure. Return if lesion fails  to fully resolve.  Recommended she start using Mediplast on the area starting on Monday.  - Cont Jublia.   - Pt to return to clinic in 1 month.        Again, thank you for allowing me to participate in the care of your patient.        Sincerely,        Fermin Alvarez DPM

## 2021-12-03 NOTE — PROGRESS NOTES
Chief Complaint:   Chief Complaint   Patient presents with     Follow Up     1 month follow up. Plantar wart, right.           Allergies   Allergen Reactions     Cat Dander [Animal Dander] Unknown     Mite-Dermatophagoides Pteronyssinus, Standardized [Dust Mite Extract] Unknown     Venom-Honey Bee [Bee Venom] Unknown         Subjective: Verónica is a 71 year old female who presents to the clinic today for a follow up of wart on the bottom of the right foot.  She has been using the Mediplast on the area.  She has been using the Jublia on the nails.    Objective  Data Unavailable Data Unavailable Data Unavailable Data Unavailable Data Unavailable 0 lbs 0 oz  DP PT pulses are 2/4.  Capillary fill time is instant.  Positive pedal hair.  Gross sensation is intact.  Equinus is noted bilaterally.  Onychomycosis noted to the left hallux and fifth digits.  There is a hyperkeratotic lesion to the plantar surface of the right foot at the fourth metatarsal head.  This was sharply debrided and exhibited pain with lateral compression, pinpoint bleeding, and loss of skin tension lines.    Assessment: Onychomycosis of the left foot and verruca plantaris of the right foot.  I discussed treatment options with her.  She does not want to have any oral treatment for the left foot.  I recommend she use Jublia.  This was sent to the pharmacy.  For verruca, she would like liquid nitrogen treatments.    Plan:   - Pt seen and evaluated  - Liquid nitrogen was applied for 10-12 seconds to the skin lesion and the expected blistering or scabbing reaction explained. Do not pick at the area. Patient reminded to expect hypopigmented scars from the procedure. Return if lesion fails to fully resolve.  Recommended she start using Mediplast on the area starting on Monday.  - Cont Jublia.   - Pt to return to clinic in 1 month.

## 2021-12-08 ENCOUNTER — TRANSFERRED RECORDS (OUTPATIENT)
Dept: HEALTH INFORMATION MANAGEMENT | Facility: CLINIC | Age: 71
End: 2021-12-08
Payer: COMMERCIAL

## 2021-12-09 ENCOUNTER — VIRTUAL VISIT (OUTPATIENT)
Dept: INTERNAL MEDICINE | Facility: CLINIC | Age: 71
End: 2021-12-09
Payer: COMMERCIAL

## 2021-12-09 DIAGNOSIS — Z53.9 NO SHOW: Primary | ICD-10-CM

## 2022-01-09 ENCOUNTER — HEALTH MAINTENANCE LETTER (OUTPATIENT)
Age: 72
End: 2022-01-09

## 2022-01-26 ENCOUNTER — OFFICE VISIT (OUTPATIENT)
Dept: ORTHOPEDICS | Facility: CLINIC | Age: 72
End: 2022-01-26
Payer: COMMERCIAL

## 2022-01-26 DIAGNOSIS — B35.1 OM (ONYCHOMYCOSIS): ICD-10-CM

## 2022-01-26 DIAGNOSIS — B07.0 VERRUCA PLANTARIS: ICD-10-CM

## 2022-01-26 DIAGNOSIS — M79.641 PAIN IN BOTH HANDS: Primary | ICD-10-CM

## 2022-01-26 DIAGNOSIS — M79.642 PAIN IN BOTH HANDS: Primary | ICD-10-CM

## 2022-01-26 PROCEDURE — 99213 OFFICE O/P EST LOW 20 MIN: CPT | Performed by: PODIATRIST

## 2022-01-26 NOTE — PROGRESS NOTES
Chief Complaint   Patient presents with     Follow Up     1 month follow up. Plantar wart, right.             Allergies   Allergen Reactions     Cat Dander [Animal Dander] Unknown     Mite-Dermatophagoides Pteronyssinus, Standardized [Dust Mite Extract] Unknown     Venom-Honey Bee [Bee Venom] Unknown         Subjective: Verónica is a 71 year old female who presents to the clinic today for a follow up of wart on the bottom of the right foot.  She has been using the Mediplast on the area.  She has not been using the Jublia on the nails, as she is trying to save her supply until she gets a full 12 months all at once..    Objective  Data Unavailable Data Unavailable Data Unavailable Data Unavailable Data Unavailable 0 lbs 0 oz  DP PT pulses are 2/4.  Capillary fill time is instant.  Positive pedal hair.  Gross sensation is intact.  Equinus is noted bilaterally.  Onychomycosis noted to the left hallux and fifth digits.  Hyperkeratotic lesion on the right plantar foot is gone today.  There is no pain with lateral compression of the area.  No pinpoint bleeding.    Assessment: Onychomycosis of the left foot and verruca plantaris of the right foot.  This has resolved.  She still has onychomycosis of left side.  She will start using the Jublia, when she gets more stockpile of this.    Plan:   - Pt seen and evaluated  -No further treatment needed for verruca.  - Cont Jublia.   - Pt to return to clinic PRN.

## 2022-01-26 NOTE — LETTER
1/26/2022         RE: Verónica Boles  [address]  [place] [address]        Dear Colleague,    Thank you for referring your patient, Verónica Boles, to the [facility] [place]. Please see a copy of my visit note below.    Chief Complaint   Patient presents with     Follow Up     1 month follow up. Plantar wart, right.             Allergies   Allergen Reactions     Cat Dander [Animal Dander] Unknown     Mite-Dermatophagoides Pteronyssinus, Standardized [Dust Mite Extract] Unknown     Venom-Honey Bee [Bee Venom] Unknown         Subjective: Verónica is a 71 year old female who presents to the clinic today for a follow up of wart on the bottom of the right foot.  She has been using the Mediplast on the area.  She has not been using the Jublia on the nails, as she is trying to save her supply until she gets a full 12 months all at once..    Objective  Data Unavailable Data Unavailable Data Unavailable Data Unavailable Data Unavailable 0 lbs 0 oz  DP PT pulses are 2/4.  Capillary fill time is instant.  Positive pedal hair.  Gross sensation is intact.  Equinus is noted bilaterally.  Onychomycosis noted to the left hallux and fifth digits.  Hyperkeratotic lesion on the right plantar foot is gone today.  There is no pain with lateral compression of the area.  No pinpoint bleeding.    Assessment: Onychomycosis of the left foot and verruca plantaris of the right foot.  This has resolved.  She still has onychomycosis of left side.  She will start using the Jublia, when she gets more stockpile of this.    Plan:   - Pt seen and evaluated  -No further treatment needed for verruca.  - Cont Jublia.   - Pt to return to clinic PRN.        Fermin Alvarez DPM

## 2022-01-31 ENCOUNTER — TELEPHONE (OUTPATIENT)
Dept: INTERNAL MEDICINE | Facility: CLINIC | Age: 72
End: 2022-01-31
Payer: COMMERCIAL

## 2022-01-31 DIAGNOSIS — F51.01 PRIMARY INSOMNIA: Primary | ICD-10-CM

## 2022-02-01 ENCOUNTER — MEDICAL CORRESPONDENCE (OUTPATIENT)
Dept: HEALTH INFORMATION MANAGEMENT | Facility: CLINIC | Age: 72
End: 2022-02-01
Payer: COMMERCIAL

## 2022-02-02 RX ORDER — AMITRIPTYLINE HYDROCHLORIDE 10 MG/1
TABLET ORAL
Qty: 270 TABLET | Refills: 3 | Status: SHIPPED | OUTPATIENT
Start: 2022-02-02 | End: 2022-06-21

## 2022-02-02 NOTE — TELEPHONE ENCOUNTER
"Outpatient Medication Detail     Disp Refills Start End WALLACE   amitriptyline (ELAVIL) 10 MG tablet 90 tablet 12 2021  No   Si-3 tabs a day as needed   Sent to pharmacy as: amitriptyline 10 mg tablet (ELAVIL)   E-Prescribing Status: Receipt confirmed by pharmacy (2021  3:21 PM CST)       Last office visit provider:  21     Requested Prescriptions   Pending Prescriptions Disp Refills     amitriptyline (ELAVIL) 10 MG tablet [Pharmacy Med Name: AMITRIPTYLINE HCL 10 MG TAB 10 Tablet] 90 tablet 12     Sig: TAKE 2-3 TABLETS BY MOUTH A DAY AS NEEDED       Tricyclic Agents ( Annual appt and no PHQ9) Passed - 2022 11:32 AM        Passed - Blood Pressure under 140/90 in past 12 mos     BP Readings from Last 3 Encounters:   21 132/70   20 120/68   20 126/80                 Passed - Recent (12 mo) or future (30 days) visit within authorizing provider's specialty     Patient has had an office visit with the authorizing provider or a provider within the authorizing providers department within the previous 12 mos or has a future within next 30 days. See \"Patient Info\" tab in inbasket, or \"Choose Columns\" in Meds & Orders section of the refill encounter.              Passed - Medication is active on med list        Passed - Patient is age 18 or older        Passed - Patient is not pregnant        Passed - No positive pregnancy test on record in past 12 mos             Salazar Spaulding RN 22 9:20 AM  "

## 2022-02-08 ENCOUNTER — TRANSFERRED RECORDS (OUTPATIENT)
Dept: HEALTH INFORMATION MANAGEMENT | Facility: CLINIC | Age: 72
End: 2022-02-08
Payer: COMMERCIAL

## 2022-02-08 DIAGNOSIS — E78.5 HYPERLIPIDEMIA LDL GOAL <130: Primary | ICD-10-CM

## 2022-02-10 RX ORDER — ATORVASTATIN CALCIUM 20 MG/1
TABLET, FILM COATED ORAL
Qty: 90 TABLET | Refills: 2 | Status: SHIPPED | OUTPATIENT
Start: 2022-02-10 | End: 2022-05-17

## 2022-02-11 ENCOUNTER — MYC MEDICAL ADVICE (OUTPATIENT)
Dept: INTERNAL MEDICINE | Facility: CLINIC | Age: 72
End: 2022-02-11
Payer: COMMERCIAL

## 2022-02-11 ENCOUNTER — TELEPHONE (OUTPATIENT)
Dept: INTERNAL MEDICINE | Facility: CLINIC | Age: 72
End: 2022-02-11

## 2022-02-11 NOTE — TELEPHONE ENCOUNTER
Reason for Call:  Other call back    Detailed comments: Pt stating she will need a longer appt rather than the scheduled 20min appt - she has lots to discuss with DR Cem Castaneda, also [facility] appt that took place on 04/13/2022, meds     Please advise    Phone Number Patient can be reached at: Home number on file [phone] (home)    Best Time: anytime    Can we leave a detailed message on this number? NO    Call taken on 2/11/2022 at 2:47 PM by Piedad Wilkerson

## 2022-02-14 NOTE — TELEPHONE ENCOUNTER
Please schedule pt for f/u with Dr Hamm for osteoporosis management as requested in her My chart message.

## 2022-02-28 ENCOUNTER — OFFICE VISIT (OUTPATIENT)
Dept: INTERNAL MEDICINE | Facility: CLINIC | Age: 72
End: 2022-02-28
Payer: COMMERCIAL

## 2022-02-28 VITALS
DIASTOLIC BLOOD PRESSURE: 78 MMHG | WEIGHT: 170 LBS | HEART RATE: 94 BPM | SYSTOLIC BLOOD PRESSURE: 128 MMHG | RESPIRATION RATE: 20 BRPM | OXYGEN SATURATION: 96 % | HEIGHT: 67 IN | BODY MASS INDEX: 26.68 KG/M2

## 2022-02-28 DIAGNOSIS — F33.1 MODERATE EPISODE OF RECURRENT MAJOR DEPRESSIVE DISORDER (H): ICD-10-CM

## 2022-02-28 DIAGNOSIS — M25.551 HIP PAIN, RIGHT: ICD-10-CM

## 2022-02-28 DIAGNOSIS — F41.9 ANXIETY: ICD-10-CM

## 2022-02-28 DIAGNOSIS — L71.9 ROSACEA: ICD-10-CM

## 2022-02-28 DIAGNOSIS — Z00.00 ENCOUNTER FOR MEDICARE ANNUAL WELLNESS EXAM: Primary | ICD-10-CM

## 2022-02-28 DIAGNOSIS — R53.83 FATIGUE, UNSPECIFIED TYPE: ICD-10-CM

## 2022-02-28 DIAGNOSIS — M89.9 DISORDER OF BONE AND CARTILAGE: ICD-10-CM

## 2022-02-28 DIAGNOSIS — E78.5 HYPERLIPIDEMIA LDL GOAL <130: ICD-10-CM

## 2022-02-28 DIAGNOSIS — H01.006 BLEPHARITIS OF BOTH EYES, UNSPECIFIED EYELID, UNSPECIFIED TYPE: ICD-10-CM

## 2022-02-28 DIAGNOSIS — M81.0 AGE-RELATED OSTEOPOROSIS WITHOUT CURRENT PATHOLOGICAL FRACTURE: ICD-10-CM

## 2022-02-28 DIAGNOSIS — M94.9 DISORDER OF BONE AND CARTILAGE: ICD-10-CM

## 2022-02-28 DIAGNOSIS — H01.003 BLEPHARITIS OF BOTH EYES, UNSPECIFIED EYELID, UNSPECIFIED TYPE: ICD-10-CM

## 2022-02-28 DIAGNOSIS — E55.9 VITAMIN D DEFICIENCY: ICD-10-CM

## 2022-02-28 DIAGNOSIS — H91.93 DECREASED HEARING OF BOTH EARS: ICD-10-CM

## 2022-02-28 PROCEDURE — 99397 PER PM REEVAL EST PAT 65+ YR: CPT | Performed by: INTERNAL MEDICINE

## 2022-02-28 PROCEDURE — 99214 OFFICE O/P EST MOD 30 MIN: CPT | Mod: 25 | Performed by: INTERNAL MEDICINE

## 2022-02-28 RX ORDER — HYPOCHLOROUS ACID/SODIUM CHLOR 0.01 %
SPRAY, NON-AEROSOL (ML) TOPICAL
COMMUNITY
End: 2023-03-27

## 2022-02-28 RX ORDER — DOXYCYCLINE 100 MG/1
100 CAPSULE ORAL DAILY
COMMUNITY
Start: 2022-02-08

## 2022-02-28 RX ORDER — BUPROPION HYDROCHLORIDE 150 MG/1
150 TABLET ORAL EVERY MORNING
Qty: 30 TABLET | Refills: 3 | Status: SHIPPED | OUTPATIENT
Start: 2022-02-28 | End: 2022-07-03

## 2022-02-28 RX ORDER — EPINEPHRINE 0.3 MG/.3ML
INJECTION SUBCUTANEOUS
COMMUNITY
Start: 2021-05-27 | End: 2023-06-13

## 2022-02-28 ASSESSMENT — ANXIETY QUESTIONNAIRES
GAD7 TOTAL SCORE: 13
1. FEELING NERVOUS, ANXIOUS, OR ON EDGE: NEARLY EVERY DAY
6. BECOMING EASILY ANNOYED OR IRRITABLE: SEVERAL DAYS
7. FEELING AFRAID AS IF SOMETHING AWFUL MIGHT HAPPEN: SEVERAL DAYS
7. FEELING AFRAID AS IF SOMETHING AWFUL MIGHT HAPPEN: SEVERAL DAYS
2. NOT BEING ABLE TO STOP OR CONTROL WORRYING: SEVERAL DAYS
3. WORRYING TOO MUCH ABOUT DIFFERENT THINGS: MORE THAN HALF THE DAYS
6. BECOMING EASILY ANNOYED OR IRRITABLE: SEVERAL DAYS
GAD7 TOTAL SCORE: 13
2. NOT BEING ABLE TO STOP OR CONTROL WORRYING: SEVERAL DAYS
IF YOU CHECKED OFF ANY PROBLEMS ON THIS QUESTIONNAIRE, HOW DIFFICULT HAVE THESE PROBLEMS MADE IT FOR YOU TO DO YOUR WORK, TAKE CARE OF THINGS AT HOME, OR GET ALONG WITH OTHER PEOPLE: SOMEWHAT DIFFICULT
1. FEELING NERVOUS, ANXIOUS, OR ON EDGE: NEARLY EVERY DAY
5. BEING SO RESTLESS THAT IT IS HARD TO SIT STILL: MORE THAN HALF THE DAYS
3. WORRYING TOO MUCH ABOUT DIFFERENT THINGS: MORE THAN HALF THE DAYS
5. BEING SO RESTLESS THAT IT IS HARD TO SIT STILL: MORE THAN HALF THE DAYS

## 2022-02-28 ASSESSMENT — PATIENT HEALTH QUESTIONNAIRE - PHQ9
5. POOR APPETITE OR OVEREATING: NEARLY EVERY DAY
SUM OF ALL RESPONSES TO PHQ QUESTIONS 1-9: 17
5. POOR APPETITE OR OVEREATING: NEARLY EVERY DAY

## 2022-02-28 ASSESSMENT — ACTIVITIES OF DAILY LIVING (ADL): CURRENT_FUNCTION: HOUSEWORK REQUIRES ASSISTANCE

## 2022-02-28 NOTE — PATIENT INSTRUCTIONS
1. Continue Amitriptyline, add Wellbutrin to it (in am). If wellbutrin makes anxiety worse, we can change it to Lexapro.    2. Schedule fasting lab appointment and hip XR     3. Try PT for hip pain    Patient Education   Personalized Prevention Plan  You are due for the preventive services outlined below.  Your care team is available to assist you in scheduling these services.  If you have already completed any of these items, please share that information with your care team to update in your medical record.  Health Maintenance Due   Topic Date Due     ANNUAL REVIEW OF HM ORDERS  Never done     FALL RISK ASSESSMENT  12/04/2021     Mammogram  02/09/2022     Activities of Daily Living    Your Health Risk Assessment indicates you have difficulties with activities of daily living such as housework, bathing, preparing meals, taking medication, etc. Please make a follow up appointment for us to address this issue in more detail.    Signs of Hearing Loss      Hearing much better with one ear can be a sign of hearing loss.   Hearing loss is a problem shared by many people. In fact, it is one of the most common health problems, particularly as people age. Most people age 65 and older have some hearing loss. By age 80, almost everyone does. Hearing loss often occurs slowly over the years. So you may not realize your hearing has gotten worse.  Have your hearing checked  Call your healthcare provider if you:    Have to strain to hear normal conversation    Have to watch other people s faces very carefully to follow what they re saying    Need to ask people to repeat what they ve said    Often misunderstand what people are saying    Turn the volume of the television or radio up so high that others complain    Feel that people are mumbling when they re talking to you    Find that the effort to hear leaves you feeling tired and irritated    Notice, when using the phone, that you hear better with one ear than the other  [facility]  last reviewed this educational content on 1/1/2020 2000-2021 [employer]. All rights reserved. This information is not intended as a substitute for professional medical care. Always follow your healthcare professional's instructions.          Urinary Incontinence, Female (Adult)   Urinary incontinence means loss of bladder control. This problem affects many women, especially as they get older. If you have incontinence, you may be embarrassed to ask for help. But know that this problem can be treated.   Types of Incontinence  There are different types of incontinence. Two of the main types are described here. You can have more than one type.     Stress incontinence. With this type, urine leaks when pressure (stress) is put on the bladder. This may happen when you cough, sneeze, or laugh. Stress incontinence most often occurs because the pelvic floor muscles that support the bladder and urethra are weak. This can happen after pregnancy and vaginal childbirth or a hysterectomy. It can also be due to excess body weight or hormone changes.    Urge incontinence (also called overactive bladder). With this type, a sudden urge to urinate is felt often. This may happen even though there may not be much urine in the bladder. The need to urinate often during the night is common. Urge incontinence most often occurs because of bladder spasms. This may be due to bladder irritation or infection. Damage to bladder nerves or pelvic muscles, constipation, and certain medicines can also lead to urge incontinence.  Treatment depends on the cause. Further evaluation is needed to find the type you have. This will likely include an exam and certain tests. Based on the results, you and your healthcare provider can then plan treatment. Until a diagnosis is made, the home care tips below can help ease symptoms.   Home care    Do pelvic floor muscle exercises, if they are prescribed. The pelvic floor muscles help support the  bladder and urethra. Many women find that their symptoms improve when doing special exercises that strengthen these muscles. To do the exercises, contract the muscles you would use to stop your stream of urine. But do this when you re not urinating. Hold for 10 seconds, then relax. Repeat 10 to 20 times in a row, at least 3 times a day. Your healthcare provider may give you other instructions for how to do the exercises and how often.    Keep a bladder diary. This helps track how often and how much you urinate over a set period of time. Bring this diary with you to your next visit with the provider. The information can help your provider learn more about your bladder problem.    Lose weight, if advised to by your provider. Extra weight puts pressure on the bladder. Your provider can help you create a weight-loss plan that s right for you. This may include exercising more and making certain diet changes.    Don't have foods and drinks that may irritate the bladder. These can include alcohol and caffeinated drinks.    Quit smoking. Smoking and other tobacco use can lead to a long-term (chronic) cough that strains the pelvic floor muscles. Smoking may also damage the bladder and urethra. Talk with your provider about treatments or methods you can use to quit smoking.    If drinking large amounts of fluid makes you have symptoms, you may be advised to limit your fluid intake. You may also be advised to drink most of your fluids during the day and to limit fluids at night.    If you re worried about urine leakage or accidents, you may wear absorbent pads to catch urine. Change the pads often. This helps reduce discomfort. It may also reduce the risk of skin or bladder infections.    Follow-up care  Follow up with your healthcare provider, or as directed. It may take some to find the right treatment for your problem. But healthy lifestyle changes can be made right away. These include such things as exercising on a regular  basis, eating a healthy diet, losing weight (if needed), and quitting smoking. Your treatment plan may include special therapies or medicines. Certain procedures or surgery may also be options. Talk about any questions you have with your provider.   When to seek medical advice  Call the healthcare provider right away if any of these occur:    Fever of 100.4 F (38 C) or higher, or as directed by your provider    Bladder pain or fullness    Belly swelling    Nausea or vomiting    Back pain    Weakness, dizziness, or fainting  [employer] last reviewed this educational content on 1/1/2020 2000-2021 [employer]. All rights reserved. This information is not intended as a substitute for professional medical care. Always follow your healthcare professional's instructions.        Your Health Risk Assessment indicates you feel you are not in good emotional health.    Recreation   Recreation is not limited to sports and team events. It includes any activity that provides relaxation, interest, enjoyment, and exercise. Recreation provides an outlet for physical, mental, and social energy. It can give a sense of worth and achievement. It can help you stay healthy.    Mental Exercise and Social Involvement  Mental and emotional health is as important as physical health. Keep in touch with friends and family. Stay as active as possible. Continue to learn and challenge yourself.   Things you can do to stay mentally active are:    Learn something new, like a foreign language or musical instrument.     Play SCRABBLE or do crossword puzzles. If you cannot find people to play these games with you at home, you can play them with others on your computer through the Internet.     Join a games club--anything from card games to chess or checkers or lawn bowling.     Start a new hobby.     Go back to school.     Volunteer.     Read.   Keep up with world events.
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
## 2022-02-28 NOTE — PROGRESS NOTES
"    The patient reports that she has difficulty with activities of daily living. I have asked that the patient make a follow up appointment in 4 weeks where this issue will be further evaluated and addressed.  The patient was provided with written information regarding signs of hearing loss.  Information on urinary incontinence and treatment options given to patient.  The patient was provided with suggestions to help her develop a healthy emotional lifestyle.  Answers for HPI/ROS submitted by the patient on 2/28/2022  In general, how would you rate your overall physical health?: good  Frequency of exercise:: 2-3 days/week  Do you usually eat at least 4 servings of fruit and vegetables a day, include whole grains & fiber, and avoid regularly eating high fat or \"junk\" foods? : Yes  Taking medications regularly:: Yes  Medication side effects:: Muscle aches  Activities of Daily Living: housework requires assistance  Home safety: no safety concerns identified  Hearing Impairment:: difficulty following a conversation in a noisy restaurant or crowded room, difficulty following dialogue in the theater, difficult to understand a speaker at a public meeting or Adventism service, need to ask people to speak up or repeat themselves, difficulty understanding soft or whispered speech  In the past 6 months, have you been bothered by leaking of urine?: Yes  In general, how would you rate your overall mental or emotional health?: fair  Additional concerns today:: Yes  Duration of exercise:: 30-45 minutes      "

## 2022-03-01 ASSESSMENT — ANXIETY QUESTIONNAIRES: GAD7 TOTAL SCORE: 13

## 2022-04-05 ENCOUNTER — ANCILLARY PROCEDURE (OUTPATIENT)
Dept: BONE DENSITY | Facility: CLINIC | Age: 72
End: 2022-04-05
Attending: INTERNAL MEDICINE
Payer: COMMERCIAL

## 2022-04-05 PROCEDURE — 77080 DXA BONE DENSITY AXIAL: CPT | Performed by: INTERNAL MEDICINE

## 2022-04-13 ENCOUNTER — MYC MEDICAL ADVICE (OUTPATIENT)
Dept: INTERNAL MEDICINE | Facility: CLINIC | Age: 72
End: 2022-04-13

## 2022-04-13 ENCOUNTER — LAB (OUTPATIENT)
Dept: LAB | Facility: CLINIC | Age: 72
End: 2022-04-13
Payer: COMMERCIAL

## 2022-04-13 DIAGNOSIS — R53.83 FATIGUE, UNSPECIFIED TYPE: ICD-10-CM

## 2022-04-13 DIAGNOSIS — E78.5 HYPERLIPIDEMIA LDL GOAL <130: ICD-10-CM

## 2022-04-13 DIAGNOSIS — E55.9 VITAMIN D DEFICIENCY: ICD-10-CM

## 2022-04-13 LAB
ALBUMIN SERPL-MCNC: 3.9 G/DL (ref 3.5–5)
ALP SERPL-CCNC: 53 U/L (ref 45–120)
ALT SERPL W P-5'-P-CCNC: 15 U/L (ref 0–45)
ANION GAP SERPL CALCULATED.3IONS-SCNC: 12 MMOL/L (ref 5–18)
AST SERPL W P-5'-P-CCNC: 23 U/L (ref 0–40)
BASOPHILS # BLD AUTO: 0 10E3/UL (ref 0–0.2)
BASOPHILS NFR BLD AUTO: 1 %
BILIRUB SERPL-MCNC: 0.6 MG/DL (ref 0–1)
BUN SERPL-MCNC: 13 MG/DL (ref 8–28)
CALCIUM SERPL-MCNC: 9.4 MG/DL (ref 8.5–10.5)
CHLORIDE BLD-SCNC: 105 MMOL/L (ref 98–107)
CHOLEST SERPL-MCNC: 262 MG/DL
CO2 SERPL-SCNC: 25 MMOL/L (ref 22–31)
CREAT SERPL-MCNC: 0.89 MG/DL (ref 0.6–1.1)
EOSINOPHIL # BLD AUTO: 0.1 10E3/UL (ref 0–0.7)
EOSINOPHIL NFR BLD AUTO: 1 %
ERYTHROCYTE [DISTWIDTH] IN BLOOD BY AUTOMATED COUNT: 13 % (ref 10–15)
FASTING STATUS PATIENT QL REPORTED: YES
GFR SERPL CREATININE-BSD FRML MDRD: 69 ML/MIN/1.73M2
GLUCOSE BLD-MCNC: 95 MG/DL (ref 70–125)
HCT VFR BLD AUTO: 47.3 % (ref 35–47)
HDLC SERPL-MCNC: 77 MG/DL
HGB BLD-MCNC: 15.2 G/DL (ref 11.7–15.7)
IMM GRANULOCYTES # BLD: 0 10E3/UL
IMM GRANULOCYTES NFR BLD: 0 %
LDLC SERPL CALC-MCNC: 167 MG/DL
LYMPHOCYTES # BLD AUTO: 1.5 10E3/UL (ref 0.8–5.3)
LYMPHOCYTES NFR BLD AUTO: 23 %
MCH RBC QN AUTO: 32.4 PG (ref 26.5–33)
MCHC RBC AUTO-ENTMCNC: 32.1 G/DL (ref 31.5–36.5)
MCV RBC AUTO: 101 FL (ref 78–100)
MONOCYTES # BLD AUTO: 0.4 10E3/UL (ref 0–1.3)
MONOCYTES NFR BLD AUTO: 6 %
NEUTROPHILS # BLD AUTO: 4.5 10E3/UL (ref 1.6–8.3)
NEUTROPHILS NFR BLD AUTO: 69 %
PLATELET # BLD AUTO: 284 10E3/UL (ref 150–450)
POTASSIUM BLD-SCNC: 4.3 MMOL/L (ref 3.5–5)
PROT SERPL-MCNC: 6.8 G/DL (ref 6–8)
RBC # BLD AUTO: 4.69 10E6/UL (ref 3.8–5.2)
SODIUM SERPL-SCNC: 142 MMOL/L (ref 136–145)
TRIGL SERPL-MCNC: 88 MG/DL
TSH SERPL DL<=0.005 MIU/L-ACNC: 2.38 UIU/ML (ref 0.3–5)
WBC # BLD AUTO: 6.5 10E3/UL (ref 4–11)

## 2022-04-13 PROCEDURE — 85025 COMPLETE CBC W/AUTO DIFF WBC: CPT

## 2022-04-13 PROCEDURE — 80053 COMPREHEN METABOLIC PANEL: CPT

## 2022-04-13 PROCEDURE — 36415 COLL VENOUS BLD VENIPUNCTURE: CPT

## 2022-04-13 PROCEDURE — 84443 ASSAY THYROID STIM HORMONE: CPT

## 2022-04-13 PROCEDURE — 82306 VITAMIN D 25 HYDROXY: CPT

## 2022-04-13 PROCEDURE — 80061 LIPID PANEL: CPT

## 2022-04-14 LAB — DEPRECATED CALCIDIOL+CALCIFEROL SERPL-MC: 53 UG/L (ref 20–75)

## 2022-04-26 ENCOUNTER — ANCILLARY PROCEDURE (OUTPATIENT)
Dept: MAMMOGRAPHY | Facility: CLINIC | Age: 72
End: 2022-04-26
Payer: COMMERCIAL

## 2022-04-26 DIAGNOSIS — Z12.31 VISIT FOR SCREENING MAMMOGRAM: ICD-10-CM

## 2022-04-26 PROCEDURE — 77063 BREAST TOMOSYNTHESIS BI: CPT | Performed by: RADIOLOGY

## 2022-04-26 PROCEDURE — 77067 SCR MAMMO BI INCL CAD: CPT | Performed by: RADIOLOGY

## 2022-05-13 ENCOUNTER — TELEPHONE (OUTPATIENT)
Dept: INTERNAL MEDICINE | Facility: CLINIC | Age: 72
End: 2022-05-13
Payer: COMMERCIAL

## 2022-05-13 DIAGNOSIS — M81.0 AGE-RELATED OSTEOPOROSIS WITHOUT CURRENT PATHOLOGICAL FRACTURE: Primary | ICD-10-CM

## 2022-05-13 RX ORDER — ABALOPARATIDE 2000 UG/ML
80 INJECTION, SOLUTION SUBCUTANEOUS DAILY
Qty: 1.56 ML | Refills: 0 | Status: SHIPPED | OUTPATIENT
Start: 2022-05-13 | End: 2022-05-26

## 2022-05-13 NOTE — TELEPHONE ENCOUNTER
Yola Johnson  Only enough medication for the weekend.     STEPHANIE  1:57 PM  Note  Reason for Call:  Medication or medication refill:     Do you use a [facility]?  Name of the pharmacy and phone number for the current request:  [facility] mail order [phone]     Name of the medication requested: Tymlos(Abaloparatide) 80 mcg per dose 1 per prefill needles and alcohol wipes included     Other request: Patient called requesting for her Provider to prescribe her Osteoporosis medication until she can get in to see Dr. Hamm's first opening on 06/28/22.. Patient is on her last dose     Can we leave a detailed message on this number? NO - Patient doesn't have voicemail      Phone number patient can be reached at: Home number on file [phone] (home)     Best Time: anytime     Call taken on 5/13/2022 at 1:54 PM by Yola Johnson             From mychart msg.

## 2022-05-13 NOTE — TELEPHONE ENCOUNTER
Reason for Call:  Medication or medication refill:    Do you use a [facility]?  Name of the pharmacy and phone number for the current request:  [facility] mail order [phone]    Name of the medication requested: Tymlos(Abaloparatide) 80 mcg per dose 1 per prefill needles and alcohol wipes included    Other request: Patient called requesting for her Provider to prescribe her Osteoporosis medication until she can get in to see Dr. Hamm's first opening on 06/28/22.. Patient is on her last dose    Can we leave a detailed message on this number? NO - Patient doesn't have voicemail     Phone number patient can be reached at: Home number on file [phone] (home)    Best Time: anytime    Call taken on 5/13/2022 at 1:54 PM by Yola Johnson

## 2022-05-13 NOTE — TELEPHONE ENCOUNTER
Routing refill request to provider for review/approval because:  Medication is reported/historical    Reahn Garrett RN  [facility]

## 2022-05-16 ENCOUNTER — OFFICE VISIT (OUTPATIENT)
Dept: INTERNAL MEDICINE | Facility: CLINIC | Age: 72
End: 2022-05-16
Payer: COMMERCIAL

## 2022-05-16 VITALS — DIASTOLIC BLOOD PRESSURE: 80 MMHG | OXYGEN SATURATION: 98 % | SYSTOLIC BLOOD PRESSURE: 110 MMHG | HEART RATE: 101 BPM

## 2022-05-16 DIAGNOSIS — M81.0 AGE-RELATED OSTEOPOROSIS WITHOUT CURRENT PATHOLOGICAL FRACTURE: ICD-10-CM

## 2022-05-16 DIAGNOSIS — J06.9 VIRAL UPPER RESPIRATORY TRACT INFECTION: Primary | ICD-10-CM

## 2022-05-16 PROCEDURE — U0003 INFECTIOUS AGENT DETECTION BY NUCLEIC ACID (DNA OR RNA); SEVERE ACUTE RESPIRATORY SYNDROME CORONAVIRUS 2 (SARS-COV-2) (CORONAVIRUS DISEASE [COVID-19]), AMPLIFIED PROBE TECHNIQUE, MAKING USE OF HIGH THROUGHPUT TECHNOLOGIES AS DESCRIBED BY CMS-2020-01-R: HCPCS | Performed by: INTERNAL MEDICINE

## 2022-05-16 PROCEDURE — U0005 INFEC AGEN DETEC AMPLI PROBE: HCPCS | Performed by: INTERNAL MEDICINE

## 2022-05-16 PROCEDURE — 99213 OFFICE O/P EST LOW 20 MIN: CPT | Mod: CS | Performed by: INTERNAL MEDICINE

## 2022-05-16 RX ORDER — PEN NEEDLE, DIABETIC 31 GX5/16"
NEEDLE, DISPOSABLE MISCELLANEOUS
Qty: 90 EACH | Refills: 1 | Status: CANCELLED | OUTPATIENT
Start: 2022-05-16
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
## 2022-05-17 ENCOUNTER — NURSE TRIAGE (OUTPATIENT)
Dept: NURSING | Facility: CLINIC | Age: 72
End: 2022-05-17

## 2022-05-17 ENCOUNTER — TELEPHONE (OUTPATIENT)
Dept: INTERNAL MEDICINE | Facility: CLINIC | Age: 72
End: 2022-05-17

## 2022-05-17 ENCOUNTER — VIRTUAL VISIT (OUTPATIENT)
Dept: INTERNAL MEDICINE | Facility: CLINIC | Age: 72
End: 2022-05-17
Payer: COMMERCIAL

## 2022-05-17 ENCOUNTER — TELEPHONE (OUTPATIENT)
Dept: NURSING | Facility: CLINIC | Age: 72
End: 2022-05-17
Payer: COMMERCIAL

## 2022-05-17 DIAGNOSIS — F33.0 MILD RECURRENT MAJOR DEPRESSION (H): ICD-10-CM

## 2022-05-17 DIAGNOSIS — U07.1 COVID-19 VIRUS INFECTION: Primary | ICD-10-CM

## 2022-05-17 DIAGNOSIS — M79.7 FIBROMYALGIA: ICD-10-CM

## 2022-05-17 PROBLEM — N95.2 POSTMENOPAUSAL ATROPHIC VAGINITIS: Status: ACTIVE | Noted: 2022-05-17

## 2022-05-17 PROBLEM — M81.0 OSTEOPOROSIS: Status: ACTIVE | Noted: 2022-05-17
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
## 2022-05-17 PROBLEM — G93.32 CHRONIC FATIGUE SYNDROME: Status: ACTIVE | Noted: 2022-05-17

## 2022-05-17 PROBLEM — Z91.018 ALLERGY TO OTHER FOODS: Status: ACTIVE | Noted: 2022-05-17

## 2022-05-17 PROBLEM — K58.9 IRRITABLE BOWEL SYNDROME: Status: ACTIVE | Noted: 2022-05-17

## 2022-05-17 PROBLEM — H04.129 TEAR FILM INSUFFICIENCY: Status: ACTIVE | Noted: 2022-05-17

## 2022-05-17 PROBLEM — G43.909 MIGRAINE HEADACHE: Status: ACTIVE | Noted: 2022-05-17

## 2022-05-17 LAB — SARS-COV-2 RNA RESP QL NAA+PROBE: POSITIVE

## 2022-05-17 PROCEDURE — 99214 OFFICE O/P EST MOD 30 MIN: CPT | Mod: 95 | Performed by: INTERNAL MEDICINE

## 2022-05-17 RX ORDER — BENZONATATE 200 MG/1
200 CAPSULE ORAL 3 TIMES DAILY PRN
Qty: 20 CAPSULE | Refills: 1 | Status: SHIPPED | OUTPATIENT
Start: 2022-05-17 | End: 2022-07-07

## 2022-05-17 RX ORDER — ALBUTEROL SULFATE 90 UG/1
2 AEROSOL, METERED RESPIRATORY (INHALATION) EVERY 6 HOURS PRN
Qty: 18 G | Refills: 1 | Status: SHIPPED | OUTPATIENT
Start: 2022-05-17 | End: 2022-07-07

## 2022-05-17 RX ORDER — ABALOPARATIDE 2000 UG/ML
INJECTION, SOLUTION SUBCUTANEOUS
Qty: 1.56 ML | Refills: 0 | OUTPATIENT
Start: 2022-05-17

## 2022-05-17 RX ORDER — GUAIFENESIN 600 MG/1
600 TABLET, EXTENDED RELEASE ORAL 2 TIMES DAILY
Qty: 60 TABLET | Refills: 2 | COMMUNITY
Start: 2022-05-17 | End: 2022-07-07

## 2022-05-17 RX ORDER — PREDNISONE 20 MG/1
20 TABLET ORAL DAILY
Qty: 4 TABLET | Refills: 0 | Status: SHIPPED | OUTPATIENT
Start: 2022-05-17 | End: 2022-05-26

## 2022-05-17 NOTE — TELEPHONE ENCOUNTER
Coronavirus (COVID-19) Notification    Caller Name (Patient, parent, daughter/son, grandparent, etc)  patient    Reason for call  Notify of Positive Coronavirus (COVID-19) lab results, assess symptoms,  review [facility] recommendations    Lab Result    Lab test:  2019-nCoV rRt-PCR or SARS-CoV-2 PCR    Oropharyngeal AND/OR nasopharyngeal swabs is POSITIVE for 2019-nCoV RNA/SARS-COV-2 PCR (COVID-19 virus)      Gather patient reported symptoms   Assessment   Current Symptoms at time of phone call, reported by patient: (if no symptoms, document: No symptoms] Chest congestion, fatigue, cough   Date of symptom(s) onset (if applicable) 5/13/22     If at time of call, Patients symptoms have worsened, the Patient should contact 911 or have someone drive them to Emergency Dept promptly:      If Patient calling 911, inform 911 personal that you have tested positive for the Coronavirus (COVID-19).  Place mask on and await 911 to arrive.    If Emergency Dept, If possible, please have another adult drive you to the Emergency Dept but you need to wear mask when in contact with other people.      Treatment Options:   Patient classified as COVID treatment eligible by Epic high risk algorithm: Yes  Is the patient symptomatic at the time of result notification? Yes. Was the onset of symptoms within the last 5 days? Yes.   There are now oral medications available for the treatment of COVID-19.  Taking one of these medications within the first five days of symptoms (when people may not yet feel severely ill) has been shown to make people feel better, prevent them from getting sicker, and preventing hospitalization and death.   Does the patient agree to have a visit with a provider to discuss treatment options? No.  Reason patient declined:  Other: pt stated already scheduled virtual to discuss treatment options      Review information with Patient    Your result was positive. This means you have COVID-19 (coronavirus).    How  can I protect others?    These guidelines are for isolating before returning to work, school or .    If you DO have symptoms    Stay home and away from others     For at least 5 days after your symptoms started, AND    You are fever free for 24 hours (with no medicine that reduces fever), AND    Your other symptoms are better    Wear a mask for 10 full days anytime you are around others    If you DON'T have symptoms    Stay home and away from others for at least 5 days after your positive test    Wear a mask for 10 full days anytime you are around others    There may be different guidelines for healthcare facilities.  Please check with the specific sites before arriving.    If you have been told by a doctor that you were severely ill with COVID-19 or are immunocompromised, you should isolate for at least 10 days.    You should not go back to work until you meet the guidelines above for ending your home isolation. You don't need to be retested for COVID-19 before going back to work--studies show that you won't spread the virus if it's been at least 10 days since your symptoms started (or 20 days, if you have a weak immune system).    Employers, schools, and daycares: This is an official notice for this person's medical guidelines for returning in-person.  They must meet the above guidelines before going back to work, school or  in person.    You will receive a positive COVID-19 letter via [employer] or the mail soon with additional self-care information (exception, no letters will be sent to presurgical/preprocedure patients).    Would you like me to review some of that information with you now?  No    If you were tested for an upcoming procedure, please contact your provider for next steps.    Sunni Orellana

## 2022-05-17 NOTE — TELEPHONE ENCOUNTER
"Patient states that she was exposed to covid in her home.  Today symptoms are hurts to breath and lungs hurt.  Patient feels like a \"weight sitting on her chest\".  Patient had a negative test at home.  Patient was seen yesterday in clinic and covid test was taken and patient is waiting for results.  Pulse oximeter reads 95%.  Patient states that symptoms with her chest are different.  Patient is having a heaviness and sense of weight on her chest.  Verónica is requesting to speak with Angelina Crowell.  Patient does not want to go to ER as she is waiting for her covid test results.  Patient was seen yesterday in clinic with Angelina Evans MD.  Patient is requesting a call back from Angelina Crowell.  Please phone patient within two hours.      Nurse Triage SBAR    Is this a 2nd Level Triage? YES, LICENSED PRACTITIONER REVIEW IS REQUIRED    Situation:   Chest pressure and heaviness on chest.    Background:   Patient seen yesterday in clinic and today is worse.  Patient is waiting results of covid test.  Patient does not wish to go to ER.  Patient is requesting to speak with Angelina Crowell.    Assessment:   Patient has a cough and not sure if fever as she has no thermometer.  Patient states a pressure and heaviness in chest.  Pulse oximeter is reading 95%.    Protocol Recommended Disposition:   Go to ED Now (Or PCP Triage)    Recommendation:   Go to  ED or PCP Triage.  Patient is requesting to speak with Angelina Crowell.  Please phone patient.    Routed to provider    Does the patient meet one of the following criteria for ADS visit consideration? 16+ years old, with an [facility] PCP     TIP  Providers, please consider if this condition is appropriate for management at one of our Acute and Diagnostic Services sites.     If patient is a good candidate, please use dotphrase <dot>triageresponse and select Refer to ADS to document.  COVID 19 Nurse Triage Plan/Patient Instructions    Please be aware " that novel coronavirus (COVID-19) may be circulating in the community. If you develop symptoms such as fever, cough, or SOB or if you have concerns about the presence of another infection including coronavirus (COVID-19), please contact your health care provider or visit [email or web address].     Disposition/Instructions    ED Visit recommended. Follow protocol based instructions.     Bring Your Own Device:  Please also bring your smart device(s) (smart phones, tablets, laptops) and their charging cables for your personal use and to communicate with your care team during your visit.    Thank you for taking steps to prevent the spread of this virus.  o Limit your contact with others.  o Wear a simple mask to cover your cough.  o Wash your hands well and often.    Resources    [facility]: About COVID-19: [email or web address]    CDC: What to Do If You're Sick: www.cdc.gov/coronavirus/2019-ncov/about/steps-when-sick.html    CDC: Ending Home Isolation: www.cdc.gov/coronavirus/2019-ncov/hcp/disposition-in-home-patients.html     CDC: Caring for Someone: www.cdc.gov/coronavirus/2019-ncov/if-you-are-sick/care-for-someone.html     [facility]: Interim Guidance for Hospital Discharge to Home: www.health.[facility].mn.us/diseases/coronavirus/hcp/hospdischarge.pdf    [facility] clinical trials (COVID-19 research studies): [email or web address]     Below are the COVID-19 hotlines at the [facility] of Health ([facility]). Interpreters are available.   o For health questions: Call [phone] or 1-198.464.9160 (7 a.m. to 7 p.m.)  o For questions about schools and childcare: Call 561-565-5931 or 1-192.113.6896 (7 a.m. to 7 p.m.)                       Reason for Disposition    Chest pain or pressure    Additional Information    Negative: SEVERE difficulty breathing (e.g., struggling for each breath, speaks in single words)    Negative: Difficult to awaken or acting confused (e.g.,  disoriented, slurred speech)    Negative: Bluish (or gray) lips or face now    Negative: Shock suspected (e.g., cold/pale/clammy skin, too weak to stand, low BP, rapid pulse)    Negative: Sounds like a life-threatening emergency to the triager    Negative: SEVERE or constant chest pain or pressure  (Exception: Mild central chest pain, present only when coughing.)    Negative: MODERATE difficulty breathing (e.g., speaks in phrases, SOB even at rest, pulse 100-120)    Negative: [1] Headache AND [2] stiff neck (can't touch chin to chest)    Negative: Oxygen level (e.g., pulse oximetry) 90 percent or lower    Protocols used: CORONAVIRUS (COVID-19) DIAGNOSED OR [id number] 1.18.2022

## 2022-05-17 NOTE — TELEPHONE ENCOUNTER
Provider Response to 2nd Level Triage Request    I have reviewed the RN documentation. My recommendation is:  See below    While I appreciated suggestion, there are symptoms of change    I am certainly happy to do a video consultation later today by think it be best done once the COVID PCR has returned.  This will determine her course of action if COVID-positive or COVID-negative    If she thinks she is having a heart attack, she should go to the emergency room but it is impossible to say based on this note

## 2022-05-17 NOTE — PATIENT INSTRUCTIONS
Paxlovid twice daily full-strength    Hold atorvastatin    Benzonatate as needed    Discussed decreased efficacy of bupropion    Guaifenesin DM twice daily-patient has    Prednisone 20 mg daily 3 to 4 days

## 2022-05-17 NOTE — TELEPHONE ENCOUNTER
Spoke with Verónica. She had concerns regarding being within the window of being prescribed paxlovid if positive and wished to speak to a provider regarding this. Pt's covid is still pending, pt wanted an appointment with a provider today, she is scheduled to see Dr. Marcum. Pt instructed to seek immediate emergency medical care if she begins to have chest pain/respiratory distress.       Molly Thornton RN   [facility]
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
## 2022-05-20 DIAGNOSIS — M81.0 AGE-RELATED OSTEOPOROSIS WITHOUT CURRENT PATHOLOGICAL FRACTURE: ICD-10-CM

## 2022-05-22 RX ORDER — ABALOPARATIDE 2000 UG/ML
INJECTION, SOLUTION SUBCUTANEOUS
Qty: 1.56 ML | Refills: 0 | OUTPATIENT
Start: 2022-05-22

## 2022-05-25 ENCOUNTER — MYC MEDICAL ADVICE (OUTPATIENT)
Dept: INTERNAL MEDICINE | Facility: CLINIC | Age: 72
End: 2022-05-25
Payer: COMMERCIAL

## 2022-05-25 DIAGNOSIS — M81.0 AGE-RELATED OSTEOPOROSIS WITHOUT CURRENT PATHOLOGICAL FRACTURE: ICD-10-CM

## 2022-05-25 NOTE — TELEPHONE ENCOUNTER
Verónica Justin will be seeing Dr Hamm to manage her osteoporosis/ Tymlos orders.  In the meanwhile, she asked me to order a bridging Rx for it. I seem to be screwing up the order (they need pen needles as well).  Could you help with that?    Thank you!  NH

## 2022-05-25 NOTE — TELEPHONE ENCOUNTER
Please print DEXA scan repirt that pt attached in her My Chart message and re-scan into her chart under DEXA scan for permanent record.  Thank you!

## 2022-05-26 RX ORDER — ABALOPARATIDE 2000 UG/ML
80 INJECTION, SOLUTION SUBCUTANEOUS DAILY
Qty: 1.56 ML | Refills: 2 | Status: SHIPPED | OUTPATIENT
Start: 2022-05-26 | End: 2022-07-07

## 2022-05-26 NOTE — TELEPHONE ENCOUNTER
I sent in Tymlos and pen needles to mail order pharmacy per verbal order, and will follow up on Rx later today to ensure this was sent correctly.

## 2022-06-21 ENCOUNTER — TELEPHONE (OUTPATIENT)
Dept: INTERNAL MEDICINE | Facility: CLINIC | Age: 72
End: 2022-06-21

## 2022-06-21 DIAGNOSIS — F51.01 PRIMARY INSOMNIA: ICD-10-CM

## 2022-06-21 DIAGNOSIS — M81.0 AGE-RELATED OSTEOPOROSIS WITHOUT CURRENT PATHOLOGICAL FRACTURE: Primary | ICD-10-CM

## 2022-06-21 RX ORDER — AMITRIPTYLINE HYDROCHLORIDE 10 MG/1
TABLET ORAL
Qty: 270 TABLET | Refills: 3 | COMMUNITY
Start: 2022-06-21 | End: 2022-07-07

## 2022-06-21 NOTE — TELEPHONE ENCOUNTER
06/21 I had to call again to reschedule appt with Dr. Hamm for her osteo day only Pt got upset that the schedulers can't get it right the first time, she needs her Tymlos refilled with needles please she is rescheduled for July 7th with Dr. Hamm

## 2022-06-21 NOTE — TELEPHONE ENCOUNTER
It appears that she was initially treated at [facility] by Dr. Genie Roberson, who has since retired.  She mentioned in a [employer] message on May 25, 2022 that she was needing 2 more months of Tymlos to complete her treatment.  By phone today she mentions that she needs to complete 5 more months of the medication.  Unfortunately we do not yet have her consent to access [facility] records, we will get this when she comes to clinic for her osteoporosis appointment.  Ongoing she is establishing with Dr. Hamm for osteoporosis care.  Per Dr. Evans I did help set up the prescription to bridge her for the next 2 months of Tymlos at the end of May.  I have encouraged her to reach out to the specialty pharmacy to make sure that she gets her refill on time.  No further questions or concerns about her appointments for osteoporosis, she was simply frustrated by the several back-and-forth's regarding scheduling.    Of note she has tapered off of her amitriptyline so that she can complete allergy testing.  She has to be off of this medication for about 3 weeks prior to testing.  She is having increased anxiety due to multiple triggers.  She is wondering if there is a medication she can take to help with sleep.  I have asked her to reach out to her primary doctor via [employer] for this discussion.  This will need to be a medication without any antihistamine effects.  She may benefit from a short-term benzodiazepine, and reevaluate benefits versus risks of her maintenance medications after testing.

## 2022-07-01 DIAGNOSIS — F33.1 MODERATE EPISODE OF RECURRENT MAJOR DEPRESSIVE DISORDER (H): ICD-10-CM

## 2022-07-02 NOTE — TELEPHONE ENCOUNTER
"Routing refill request to provider for review/approval because:  PHQ-9 score was not less than 5    PHQ-9 and GAD7 Scores  12/4/2020 2/28/2022 2/28/2022    PHQ-9 Total Score 0 17 -   XIN-7 Total Score - 13 13    12/4/2020 2/28/2022 2/28/2022         Last Written Prescription Date:  02/28/2022  Last Fill Quantity: 30,  # refills: 3   Last office visit provider:   05/17/2022 with Dr Marcum.    Requested Prescriptions   Pending Prescriptions Disp Refills     buPROPion (WELLBUTRIN XL) 150 MG 24 hr tablet [Pharmacy Med Name: BUPROPION  MG  Tablet] 30 tablet 3     Sig: TAKE 1 TABLET (150 MG) BY MOUTH EVERY MORNING       SSRIs Protocol Failed - 7/1/2022  1:47 PM        Failed - PHQ-9 score less than 5 in past 6 months     Please review last PHQ-9 score.           Passed - Medication is Bupropion     If the medication is Bupropion (Wellbutrin), and the patient is taking for smoking cessation; OK to refill.          Passed - Medication is active on med list        Passed - Patient is age 18 or older        Passed - No active pregnancy on record        Passed - No positive pregnancy test in last 12 months        Passed - Recent (6 mo) or future (30 days) visit within the authorizing provider's specialty     Patient had office visit in the last 6 months or has a visit in the next 30 days with authorizing provider or within the authorizing provider's specialty.  See \"Patient Info\" tab in inbasket, or \"Choose Columns\" in Meds & Orders section of the refill encounter.                 Miladis Ratliff 07/02/22 12:58 AM  "

## 2022-07-03 RX ORDER — BUPROPION HYDROCHLORIDE 150 MG/1
150 TABLET ORAL EVERY MORNING
Qty: 90 TABLET | Refills: 3 | Status: SHIPPED | OUTPATIENT
Start: 2022-07-03 | End: 2022-10-07

## 2022-07-06 DIAGNOSIS — R09.81 SINUS CONGESTION: Primary | ICD-10-CM

## 2022-07-07 ENCOUNTER — OFFICE VISIT (OUTPATIENT)
Dept: INTERNAL MEDICINE | Facility: CLINIC | Age: 72
End: 2022-07-07
Payer: COMMERCIAL

## 2022-07-07 VITALS
HEART RATE: 88 BPM | BODY MASS INDEX: 26.19 KG/M2 | WEIGHT: 166.9 LBS | HEIGHT: 67 IN | OXYGEN SATURATION: 97 % | DIASTOLIC BLOOD PRESSURE: 76 MMHG | SYSTOLIC BLOOD PRESSURE: 124 MMHG

## 2022-07-07 DIAGNOSIS — M81.0 AGE-RELATED OSTEOPOROSIS WITHOUT CURRENT PATHOLOGICAL FRACTURE: ICD-10-CM

## 2022-07-07 DIAGNOSIS — Z79.899 HIGH RISK MEDICATION USE: Primary | ICD-10-CM

## 2022-07-07 PROCEDURE — 99215 OFFICE O/P EST HI 40 MIN: CPT | Performed by: INTERNAL MEDICINE

## 2022-07-07 RX ORDER — FLUTICASONE PROPIONATE 50 MCG
SPRAY, SUSPENSION (ML) NASAL
Qty: 16 G | Refills: 10 | Status: SHIPPED | OUTPATIENT
Start: 2022-07-07 | End: 2023-09-27

## 2022-07-07 RX ORDER — ABALOPARATIDE 2000 UG/ML
80 INJECTION, SOLUTION SUBCUTANEOUS DAILY
Qty: 1.56 ML | Refills: 12 | Status: SHIPPED | OUTPATIENT
Start: 2022-07-07 | End: 2023-05-11

## 2022-07-07 ASSESSMENT — PATIENT HEALTH QUESTIONNAIRE - PHQ9
SUM OF ALL RESPONSES TO PHQ QUESTIONS 1-9: 7
SUM OF ALL RESPONSES TO PHQ QUESTIONS 1-9: 7
10. IF YOU CHECKED OFF ANY PROBLEMS, HOW DIFFICULT HAVE THESE PROBLEMS MADE IT FOR YOU TO DO YOUR WORK, TAKE CARE OF THINGS AT HOME, OR GET ALONG WITH OTHER PEOPLE: SOMEWHAT DIFFICULT

## 2022-07-07 NOTE — PATIENT INSTRUCTIONS
Bone Density at the end of TYMLOS!    Then we pick a new medicine based on the findings!!        [place] is very important or [employer] [facility] [employer]!!
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
## 2022-07-08 NOTE — TELEPHONE ENCOUNTER
"Routing refill request to provider for review/approval because:  Medication is reported/historical    Last Written Prescription Date:  ?  Last Fill Quantity: ?,  # refills: ?   Last office visit provider:  5/17/22     Requested Prescriptions   Pending Prescriptions Disp Refills     fluticasone (FLONASE) 50 MCG/ACT nasal spray [Pharmacy Med Name: FLUTICASONE 50MCG NASAL SPR 50 MCG SPRY] 16 g 10     Sig: USE 2 SPRAYS IN EACH NOSTRIL DAILY       Nasal Allergy Protocol Passed - 7/6/2022  6:27 PM        Passed - Patient is age 12 or older        Passed - Recent (12 mo) or future (30 days) visit within the authorizing provider's specialty     Patient has had an office visit with the authorizing provider or a provider within the authorizing providers department within the previous 12 mos or has a future within next 30 days. See \"Patient Info\" tab in inbasket, or \"Choose Columns\" in Meds & Orders section of the refill encounter.              Passed - Medication is active on med list             Emmy Hubbard 07/07/22 7:56 PM  "

## 2022-07-12 ENCOUNTER — TELEPHONE (OUTPATIENT)
Dept: INTERNAL MEDICINE | Facility: CLINIC | Age: 72
End: 2022-07-12

## 2022-07-14 ENCOUNTER — MYC MEDICAL ADVICE (OUTPATIENT)
Dept: INTERNAL MEDICINE | Facility: CLINIC | Age: 72
End: 2022-07-14

## 2022-07-14 DIAGNOSIS — F41.9 ANXIETY: ICD-10-CM

## 2022-07-14 DIAGNOSIS — F33.1 MODERATE EPISODE OF RECURRENT MAJOR DEPRESSIVE DISORDER (H): Primary | ICD-10-CM

## 2022-07-16 ENCOUNTER — MYC MEDICAL ADVICE (OUTPATIENT)
Dept: BEHAVIORAL HEALTH | Facility: CLINIC | Age: 72
End: 2022-07-16

## 2022-07-16 NOTE — TELEPHONE ENCOUNTER
Writer called patient. Patient requested to be called at later time. Patient returned phone call and scheduled with  for 7.20.22 at 11:30am with Fide. Referral notified, added in tracker, and documentation sent.    Bladomero Kerr  Care Coordinator  7.16  ----- Message from Alek Brambila sent at 7/15/2022 12:40 PM CDT -----  Transition Clinic Referral   [place] Only   Limited [place] Availability    Type of Referral:    __X__Therapy Only   ____Medication Only: Referral will automatically be declined if no next level of care scheduled. Suboxone and Opioid management referrals are automatically denied.  ____Therapy & Medication:  Referral will automatically be declined if no next level of care scheduled. Suboxone and Opioid management referrals are automatically denied.  ____Diagnostic Assessment     Suboxone and Opioid Management Referrals are Automatically Denied    TC Psychiatry cannot see patients who do not have active medical insurance    Referring Provider Name: Alek Brambila    Clinician completing the assessment. Angelina Evans    Referring Provider: [facility] PROVIDER    If known, referring provider contact name: Angelina Evans; Phone Number:   Service Line/Location:     Reason for Transition Clinic Referral: F33.1 (ICD-10-CM) - Moderate episode of recurrent major depressive disorder (H)   F41.9 (ICD-10-CM) - Anxiety    Next Level of Care Patient Will Be Transitioned To:   Provider(s)  Location Verónica Vernon MRN: [id number]   Date: 12/6/2022 Status: Scheduled  Time: 10:30 AM Length: 60  Visit Type: ADULT PSYCHOTHERAPY NEW [[id number]] Copay: $0.00  Provider: Farheen Zamora LICSW Department: [facility]  Bill Area: Psychology [facility] Psychiatry cannot see patients who do not have active medical insurance    What Would Be Helpful from the Transition Clinic: bridge gap      Needs: NO    Does Patient Have Access to Technology: smart phone      Patient E-mail Address: [email or web address]    Current Patient Phone Number: [phone]; does not have V/M please try again if no   Answer     Clinician Gender Preference (if applicable): NO    Alek Brambila

## 2022-07-19 ASSESSMENT — COLUMBIA-SUICIDE SEVERITY RATING SCALE - C-SSRS
5. HAVE YOU STARTED TO WORK OUT OR WORKED OUT THE DETAILS OF HOW TO KILL YOURSELF? DO YOU INTEND TO CARRY OUT THIS PLAN?: NO
2. HAVE YOU ACTUALLY HAD ANY THOUGHTS OF KILLING YOURSELF?: YES
4. HAVE YOU HAD THESE THOUGHTS AND HAD SOME INTENTION OF ACTING ON THEM?: NO
3. HAVE YOU BEEN THINKING ABOUT HOW YOU MIGHT KILL YOURSELF?: NO
1. IN THE PAST MONTH, HAVE YOU WISHED YOU WERE DEAD OR WISHED YOU COULD GO TO SLEEP AND NOT WAKE UP?: YES
6. IN YOUR LIFETIME, HAVE YOU EVER DONE ANYTHING, STARTED TO DO ANYTHING, OR PREPARED TO DO ANYTHING TO END YOUR LIFE?: YES

## 2022-07-19 ASSESSMENT — ANXIETY QUESTIONNAIRES
GAD7 TOTAL SCORE: 10
2. NOT BEING ABLE TO STOP OR CONTROL WORRYING: MORE THAN HALF THE DAYS
8. IF YOU CHECKED OFF ANY PROBLEMS, HOW DIFFICULT HAVE THESE MADE IT FOR YOU TO DO YOUR WORK, TAKE CARE OF THINGS AT HOME, OR GET ALONG WITH OTHER PEOPLE?: VERY DIFFICULT
6. BECOMING EASILY ANNOYED OR IRRITABLE: NOT AT ALL
4. TROUBLE RELAXING: NEARLY EVERY DAY
3. WORRYING TOO MUCH ABOUT DIFFERENT THINGS: MORE THAN HALF THE DAYS
GAD7 TOTAL SCORE: 10
7. FEELING AFRAID AS IF SOMETHING AWFUL MIGHT HAPPEN: NOT AT ALL
7. FEELING AFRAID AS IF SOMETHING AWFUL MIGHT HAPPEN: NOT AT ALL
GAD7 TOTAL SCORE: 10
1. FEELING NERVOUS, ANXIOUS, OR ON EDGE: NEARLY EVERY DAY
5. BEING SO RESTLESS THAT IT IS HARD TO SIT STILL: NOT AT ALL

## 2022-07-19 ASSESSMENT — PATIENT HEALTH QUESTIONNAIRE - PHQ9
10. IF YOU CHECKED OFF ANY PROBLEMS, HOW DIFFICULT HAVE THESE PROBLEMS MADE IT FOR YOU TO DO YOUR WORK, TAKE CARE OF THINGS AT HOME, OR GET ALONG WITH OTHER PEOPLE: VERY DIFFICULT
SUM OF ALL RESPONSES TO PHQ QUESTIONS 1-9: 12
SUM OF ALL RESPONSES TO PHQ QUESTIONS 1-9: 12

## 2022-07-20 ENCOUNTER — VIRTUAL VISIT (OUTPATIENT)
Dept: BEHAVIORAL HEALTH | Facility: CLINIC | Age: 72
End: 2022-07-20
Payer: COMMERCIAL

## 2022-07-20 DIAGNOSIS — F32.9 DEPRESSION, MAJOR: Primary | ICD-10-CM

## 2022-07-20 DIAGNOSIS — F41.1 GENERALIZED ANXIETY DISORDER: ICD-10-CM

## 2022-07-20 NOTE — PROGRESS NOTES
"      [facility]   Mental Health & Addiction Services     Progress Note - Initial Visit    Patient  Name:  Verónica Horn Date: 07/20/2022         Service Type: Individual     Visit Start Time: 11:30 am Visit End Time: 12:30 pm    Visit #: 1    Attendees: Client attended alone    Service Modality:  Phone Visit:      Provider verified identity through the following two step process.  Patient provided:  Patient was verified at admission/transfer    The patient has been notified of the following:      \"We have found that certain health care needs can be provided without the need for a face to face visit.  This service lets us provide the care you need with a phone conversation.       I will have full access to your [facility] medical record during this entire phone call.   I will be taking notes for your medical record.      Since this is like an office visit, we will bill your insurance company for this service.       There are potential benefits and risks of telephone visits (e.g. limits to patient confidentiality) that differ from in-person visits.?Confidentiality still applies for telephone services, and nobody will record the visit.  It is important to be in a quiet, private space that is free of distractions (including cell phone or other devices) during the visit.??      If during the course of the call I believe a telephone visit is not appropriate, you will not be charged for this service\"     Consent has been obtained for this service by care team member: Yes        DATA:   Interactive Complexity: No   Crisis: No     Presenting Concerns/  Current Stressors:   Verónica is a 72 year old female with current presenting concerns that are related to symptoms that meet the criteria for Major Depressive Disorder, recurrent episode with anxious distress including depressed mood, diminished interest in activities, hopelessness, worthlessness, excess guilt, fatigue/low energy, sleep " "disturbance and loss of appetite. She also endorses the following symptoms of Generalized Anxiety disorder, irritability, racing thoughts, excessive anxiety and worry occurring more days than not for at least 6 months about a number of events and activities, difficulty controlling worry, restlessness and being easily fatigued. Verónica reports that she has experienced some anxiety throughout her life. She explains that she feel nervous at night prior to going to bed and difficulty falling asleep due to racing thoughts about feelings of regret, shame and guilt around her current relationship with her . Verónica reports that she feels lonely and can't sleep at night. She experiences excessive worrying about her current relationship with her . Verónica reports that she gets anxious when she's at home alone with her  because he does not pay any attention to her, he does not show any affection towards her and there's no longer any intimacy in there relationship. Verónica reports that she feels like her dogs are her only friends because she doesn't receive support from her . Verónica acknowledges that most days she's not motivated to do any activities and she feels hopeless. She reports feelings of depression daily. She reports that she feels at a lost because she struggles with loneliness. Verónica states that she sleeps separately from her  in the house. Her  has his own bedroom and she has her own bedroom. She reports that do not go places together, nor travel places anymore. Verónica reports that most of the time her  does not even say \"good night\" to her. Verónica denies any suicidal thoughts or intentions to harm herself or others.Verónica has been prescribed Lexapro and Wellbutrin by her primary care physician. Verónica explains that she would like marriage counseling again, however in the past her  did not want to participate. Verónica explains that she feels like she needs long-term therapy to process " her relationship with her . These mental health symptoms are associated with an unequivocal change in functioning that is uncharacteristic of the individual when not symptomatic and the changes are observable by others. These symptoms are not attributable to physiological effects of any substance and are not better explained by another mental disorder. As a result of the pattern of Major Depressive Disorder and Generalized Anxiety Disorder related mental health symptoms, Verónica experiences clinically significant distress.      ASSESSMENT:  Mental Status Assessment:  Appearance:   Appropriate   Eye Contact:   Good   Psychomotor Behavior: Normal   Attitude:   Cooperative   Orientation:   All  Speech   Rate / Production: Normal/ Responsive   Volume:  Normal   Mood:    Depressed   Affect:    Appropriate   Thought Content:  Clear   Thought Form:  Coherent   Insight:    Fair       Safety Issues and Plan for Safety and Risk Management:   [place] Suicide Severity Rating Scale (Short Version)No flowsheet data found.  Patient denies current fears or concerns for personal safety.  Patient denies current or recent suicidal ideation or behaviors.  Patient denies current or recent homicidal ideation or behaviors.  Patient denies current or recent self injurious behavior or ideation.  Patient denies other safety concerns.  Recommended that patient call 911 or go to the local ED should there be a change in any of these risk factors.  Patient reports there are no firearms in the house.     Diagnostic Criteria:  Major Depressive Disorder  CRITERIA (A-C) REPRESENT A MAJOR DEPRESSIVE EPISODE - SELECT THESE CRITERIA  A) Single episode - symptoms have been present during the same 2-week period and represent a change from previous functioning 5 or more symptoms (required for diagnosis)   - Depressed mood. Note: In children and adolescents, can be irritable mood.     - Diminished interest or pleasure in all, or almost all,  activities.    - Fatigue or loss of energy.    - Feelings of worthlessness or excessive guilt.    - Diminished ability to think or concentrate, or indecisiveness.   B) The symptoms cause clinically significant distress or impairment in social, occupational, or other important areas of functioning  C) The episode is not attributable to the physiological effects of a substance or to another medical condition  D) The occurence of major depressive episode is not better explained by other thought / psychotic disorders  E) There has never been a manic episode or hypomanic episode      DSM5 Diagnoses: (Sustained by DSM5 Criteria Listed Above)  Diagnoses: 300.02 (F41.1) Generalized Anxiety Disorder  Psychosocial & Contextual Factors: Environmental  WHODAS 2.0 (12 item):   WHODAS 2.0 Total Score 7/19/2022   Total Score 20   Total Score MyChart 20     Intervention:   Mindfulness- Patient was educated on relaxation and mindfulness techniques and Psychodynamic- Patient processed internal experiences   Collateral Reports Completed:  Not Applicable      PLAN: (Homework, other):  1. Provider will continue Diagnostic Assessment.  Patient was given the following to do until next session:  Continue to take medication as prescribed by physician. Surround herself with friends and family that are supportive. Mindfulness Techniques for anxiety.    2. Provider recommended the following referrals: [facility], [facility], [facility].      3.  Suicide Risk and Safety Concerns were assessed for Verónica Horn.    Patient meets the following risk assessment and triage: Patient denied any current/recent/lifetime history of suicidal ideation and/or behaviors.  No safety plan indicated at this time.       JONATHON Dumont  July 20, 2022

## 2022-08-03 ENCOUNTER — VIRTUAL VISIT (OUTPATIENT)
Dept: BEHAVIORAL HEALTH | Facility: CLINIC | Age: 72
End: 2022-08-03

## 2022-08-03 DIAGNOSIS — F41.1 GENERALIZED ANXIETY DISORDER: Primary | ICD-10-CM

## 2022-08-03 DIAGNOSIS — F32.9 DEPRESSION, MAJOR: ICD-10-CM

## 2022-08-03 NOTE — PROGRESS NOTES
"    [facility]                                     Progress Note    Patient Name: Verónica Horn  Date: 08/03/2022         Service Type: Individual      Session Start Time: 1:00 PM Session End Time: 1:38 PM     Session Length: 38 minutes    Session #: 2    Attendees: Client    Service Modality:  Phone Visit:      Provider verified identity through the following two step process.  Patient provided:  Patient was verified at admission/transfer    The patient has been notified of the following:      \"We have found that certain health care needs can be provided without the need for a face to face visit.  This service lets us provide the care you need with a phone conversation.       I will have full access to your [facility] medical record during this entire phone call.   I will be taking notes for your medical record.      Since this is like an office visit, we will bill your insurance company for this service.       There are potential benefits and risks of telephone visits (e.g. limits to patient confidentiality) that differ from in-person visits.?Confidentiality still applies for telephone services, and nobody will record the visit.  It is important to be in a quiet, private space that is free of distractions (including cell phone or other devices) during the visit.??      If during the course of the call I believe a telephone visit is not appropriate, you will not be charged for this service\"     Consent has been obtained for this service by care team member: Yes     DATA  Interactive Complexity: No  Crisis: No        Progress Since Last Session (Related to Symptoms / Goals / Homework):   Symptoms: No change No change in symptoms of anxiety and depression.    Homework: Partially completed      Episode of Care Goals: Minimal progress - ACTION (Actively working towards change); Intervened by reinforcing change plan / affirming steps taken     Current / Ongoing Stressors and " Concerns:   Verónica acknowledges that she is still experiencing moderate to severe symptoms of anxiety and depression. She reports that she recently stopped taking her medication for anxiety and depression because she felt like it was not helping her symptoms. Verónica states that she still experiences racing thoughts at night due to her anxiety. Due to the racing thoughts she is unable to sleep. Verónica reports that she has loss interest in completing daily activities and has to force herself to get out of bed in the morning. She still reports low energy and fatigue. She rates her anxiety 8/10 and her depression 6/10. Verónica reports that she recently purchased a service animal to assist her with her mental health. She feels that having a service animal around will decrease her anxiety and depression. Verónica states that she still feels anxious around her  and he does not give her the needed support.     Treatment Objective(s) Addressed in This Session:   identify 3 fears / thoughts that contribute to feeling anxious       Intervention:   CBT: to change negative thought patterns.    Assessments completed prior to visit:  The following assessments were completed by patient for this visit:  PHQ9:   PHQ-9 SCORE 12/4/2020 2/28/2022 7/7/2022 7/19/2022   PHQ-9 Total Score MyChart - - 7 (Mild depression) 12 (Moderate depression)   PHQ-9 Total Score 0 17 7 12     GAD7:   XIN-7 SCORE 2/28/2022 2/28/2022 7/19/2022   Total Score - - 10 (moderate anxiety)   Total Score 13 13 10         ASSESSMENT: Current Emotional / Mental Status (status of significant symptoms):   Risk status (Self / Other harm or suicidal ideation)   Patient denies current fears or concerns for personal safety.   Patient denies current or recent suicidal ideation or behaviors.   Patient denies current or recent homicidal ideation or behaviors.   Patient denies current or recent self injurious behavior or ideation.   Patient denies other safety concerns.   Patient  reports there has been no change in risk factors since their last session.     Patient reports there has been no change in protective factors since their last session.     Recommended that patient call 911 or go to the local ED should there be a change in any of these risk factors.     Appearance:   Appropriate    Eye Contact:   Good    Psychomotor Behavior: Normal    Attitude:   Cooperative    Orientation:   All   Speech    Rate / Production: Talkative    Volume:  Loud    Mood:    Anxious  Depressed    Affect:    Appropriate    Thought Content:  Clear    Thought Form:  Coherent  Logical    Insight:    Fair      Medication Review:   No current psychiatric medications prescribed     Medication Compliance:   No     Changes in Health Issues:   None reported     Chemical Use Review:   Substance Use: Chemical use reviewed, no active concerns identified      Tobacco Use: No current tobacco use.      Diagnosis:  Major Depressive Disorder and Generalized Anxiety Disorder    Collateral Reports Completed:   Not Applicable    PLAN: (Patient Tasks / Therapist Tasks / Other)  Patient will increase awareness of anxiety and their impact on functioning and develop skills to reduce negative impact.        JONATHON Dumont                                                         ______________________________________________________________________    Individual Treatment Plan    Patient's Name: Verónica Horn  YOB: 1950    Date of Creation: 08/03/2022  Date Treatment Plan Last Reviewed/Revised: 08/03/2022    DSM5 Diagnoses: 300.02 (F41.1) Generalized Anxiety Disorder  Psychosocial / Contextual Factors: Environmental  PROMIS (reviewed every 90 days):     Referral / Collaboration:  Was/were discussed and patient will pursue.    Anticipated number of session for this episode of care: 3-6 sessions  Anticipation frequency of session: Monthly  Anticipated Duration of each session: 16-37 minutes  Treatment plan  will be reviewed in 90 days or when goals have been changed.       MeasurableTreatment Goal(s) related to diagnosis / functional impairment(s)  Goal 1: Patient will increase awareness of anxiety and their impact on functioning and develop skills to reduce negative impact.    I will know I've met my goal when baseline.      Objective #A (Patient Action)    Patient will identify 3 fears / thoughts that contribute to feeling anxious.  Status: Continued - Date(s): 09/03/2022    Intervention(s)  Therapist will teach distraction skills. to reduce symptoms of anxiety..    Objective #B  Patient will identify 3 initial signs or symptoms of anxiety.  Status: Continued - Date(s): 09/03/2022    Intervention(s)  Therapist will explore and process with patient how anxiety has impacted them..    Objective #C  Patient will use at least 3 coping skills for anxiety management in the next 3 weeks.  Status: Continued - Date(s): 09/03/2022    Intervention(s)  Therapist will will teach CBT skills and model their use..      Goal 2: Patient will increase distress tolerance coping skills.    I will know I've met my goal when baseline.      Objective #A (Patient Action)    Status: Continued - Date(s): 09/03/2022    Patient will identify at least 3 triggers for anxiety.    Intervention(s)  Therapist will assist patient to increase distress tolerance coping skills..    Objective #B  Patient will use thought-stopping strategy daily to reduce intrusive thoughts.    Status: Continued - Date(s): 09/03/2022    Intervention(s)  Therapist will assist patient to identify anxiety triggers and maladaptiv responses to them..    Objective #C  Patient will use relaxation strategies 3 times per day to reduce the physical symptoms of anxiety.  Status: Continued - Date(s): 09/03/2022    Intervention(s)  Therapist will facilitate guided discussion..      Goal 3: Patiient will increase awareness of depression symptoms and their impact on functioning and develop  skills to reduce negative impact.    I will know I've met my goal when baseline.      Objective #A (Patient Action)    Status: Continued - Date(s): 09/03/2022    Patient will Increase interest, engagement, and pleasure in doing things.    Intervention(s)  Therapist will assist patient to describe thoughts, feelings and actions associated with depression..    Objective #B  Patient will Decrease frequency and intensity of feeling down, depressed, hopeless.    Status: Continued - Date(s): 09/03/2022    Intervention(s)  Therapist will explore and process with patient how depression has impacted them..    Objective #C  Patient will Improve quantity and quality of night time sleep / decrease daytime naps.  Status: Continued - Date(s): 09/03/2022    Intervention(s)  Therapist will assist patient to increase depression coping skills..      Patient has reviewed and agreed to the above plan.      JONATHON Dumont  August 3, 2022

## 2022-08-29 ENCOUNTER — MYC MEDICAL ADVICE (OUTPATIENT)
Dept: INTERNAL MEDICINE | Facility: CLINIC | Age: 72
End: 2022-08-29

## 2022-08-29 DIAGNOSIS — M79.7 FIBROMYALGIA: Primary | ICD-10-CM

## 2022-08-29 NOTE — LETTER
To whom it may concern.    Verónica Horn is under my care. She suffers from depression, anxiety and PTSD.  She would benefit from a dog  to help improve her metal health.    Dr Evans

## 2022-08-31 ENCOUNTER — MYC MEDICAL ADVICE (OUTPATIENT)
Dept: INTERNAL MEDICINE | Facility: CLINIC | Age: 72
End: 2022-08-31

## 2022-09-02 DIAGNOSIS — F51.01 PRIMARY INSOMNIA: ICD-10-CM

## 2022-09-03 NOTE — TELEPHONE ENCOUNTER
amitriptyline (ELAVIL) 10 MG tablet (Discontinued) 270 tablet 3 6/21/2022 7/7/2022 No   Sig: HOLD per allergy for testing   Patient not taking: Reported on 7/7/2022        Class: Historical   Reason for Discontinue: Medication Reconciliation Clean Up   Order: [id number]     Pt would like to go back on it

## 2022-09-06 RX ORDER — AMITRIPTYLINE HYDROCHLORIDE 10 MG/1
TABLET ORAL
Qty: 270 TABLET | Refills: 3 | Status: SHIPPED | OUTPATIENT
Start: 2022-09-06 | End: 2022-10-07

## 2022-09-07 ENCOUNTER — TRANSFERRED RECORDS (OUTPATIENT)
Dept: HEALTH INFORMATION MANAGEMENT | Facility: CLINIC | Age: 72
End: 2022-09-07

## 2022-09-07 DIAGNOSIS — B35.1 OM (ONYCHOMYCOSIS): Primary | ICD-10-CM

## 2022-09-22 ENCOUNTER — MYC MEDICAL ADVICE (OUTPATIENT)
Dept: INTERNAL MEDICINE | Facility: CLINIC | Age: 72
End: 2022-09-22

## 2022-09-22 DIAGNOSIS — M79.7 FIBROMYALGIA: Primary | ICD-10-CM

## 2022-09-22 DIAGNOSIS — M25.551 HIP PAIN, RIGHT: ICD-10-CM

## 2022-09-22 NOTE — TELEPHONE ENCOUNTER
Pt is calling and is checking to make sure we got this info.  She has appt on Monday, 9/26 and needs this done by then please    See Order and what it needs to be in the comment part   PAST MEDICAL HISTORY:  Hypertension     Obesity, morbid, BMI 40.0-49.9     Sleep apnea PAST MEDICAL HISTORY:  Anxiety     Obesity, morbid, BMI 40.0-49.9     Sleep apnea

## 2022-10-07 ENCOUNTER — OFFICE VISIT (OUTPATIENT)
Dept: INTERNAL MEDICINE | Facility: CLINIC | Age: 72
End: 2022-10-07
Payer: COMMERCIAL

## 2022-10-07 VITALS
SYSTOLIC BLOOD PRESSURE: 134 MMHG | DIASTOLIC BLOOD PRESSURE: 80 MMHG | OXYGEN SATURATION: 99 % | WEIGHT: 168.8 LBS | HEART RATE: 69 BPM | HEIGHT: 67 IN | TEMPERATURE: 99.1 F | BODY MASS INDEX: 26.49 KG/M2

## 2022-10-07 DIAGNOSIS — M79.7 FIBROMYALGIA: ICD-10-CM

## 2022-10-07 DIAGNOSIS — F33.1 MODERATE EPISODE OF RECURRENT MAJOR DEPRESSIVE DISORDER (H): Primary | ICD-10-CM

## 2022-10-07 DIAGNOSIS — F51.01 PRIMARY INSOMNIA: ICD-10-CM

## 2022-10-07 PROCEDURE — 99213 OFFICE O/P EST LOW 20 MIN: CPT | Performed by: INTERNAL MEDICINE

## 2022-10-07 RX ORDER — CLINDAMYCIN PHOSPHATE 11.9 MG/ML
SOLUTION TOPICAL
COMMUNITY
Start: 2022-09-30 | End: 2023-06-13

## 2022-10-07 RX ORDER — AMITRIPTYLINE HYDROCHLORIDE 10 MG/1
30 TABLET ORAL AT BEDTIME
Qty: 270 TABLET | Refills: 3
Start: 2022-10-07

## 2022-10-07 RX ORDER — AMITRIPTYLINE HYDROCHLORIDE 10 MG/1
10 TABLET ORAL AT BEDTIME
Qty: 270 TABLET | Refills: 3
Start: 2022-10-07 | End: 2022-10-07

## 2022-10-07 RX ORDER — DULOXETIN HYDROCHLORIDE 20 MG/1
CAPSULE, DELAYED RELEASE ORAL
Qty: 60 CAPSULE | Refills: 3 | Status: SHIPPED | OUTPATIENT
Start: 2022-10-07

## 2022-10-07 ASSESSMENT — ANXIETY QUESTIONNAIRES
GAD7 TOTAL SCORE: 10
5. BEING SO RESTLESS THAT IT IS HARD TO SIT STILL: SEVERAL DAYS
GAD7 TOTAL SCORE: 10
IF YOU CHECKED OFF ANY PROBLEMS ON THIS QUESTIONNAIRE, HOW DIFFICULT HAVE THESE PROBLEMS MADE IT FOR YOU TO DO YOUR WORK, TAKE CARE OF THINGS AT HOME, OR GET ALONG WITH OTHER PEOPLE: VERY DIFFICULT
7. FEELING AFRAID AS IF SOMETHING AWFUL MIGHT HAPPEN: SEVERAL DAYS
1. FEELING NERVOUS, ANXIOUS, OR ON EDGE: MORE THAN HALF THE DAYS
GAD7 TOTAL SCORE: 10
3. WORRYING TOO MUCH ABOUT DIFFERENT THINGS: MORE THAN HALF THE DAYS
2. NOT BEING ABLE TO STOP OR CONTROL WORRYING: MORE THAN HALF THE DAYS
6. BECOMING EASILY ANNOYED OR IRRITABLE: NOT AT ALL
4. TROUBLE RELAXING: MORE THAN HALF THE DAYS
7. FEELING AFRAID AS IF SOMETHING AWFUL MIGHT HAPPEN: SEVERAL DAYS
8. IF YOU CHECKED OFF ANY PROBLEMS, HOW DIFFICULT HAVE THESE MADE IT FOR YOU TO DO YOUR WORK, TAKE CARE OF THINGS AT HOME, OR GET ALONG WITH OTHER PEOPLE?: VERY DIFFICULT

## 2022-10-07 ASSESSMENT — PATIENT HEALTH QUESTIONNAIRE - PHQ9
SUM OF ALL RESPONSES TO PHQ QUESTIONS 1-9: 16
10. IF YOU CHECKED OFF ANY PROBLEMS, HOW DIFFICULT HAVE THESE PROBLEMS MADE IT FOR YOU TO DO YOUR WORK, TAKE CARE OF THINGS AT HOME, OR GET ALONG WITH OTHER PEOPLE: VERY DIFFICULT
SUM OF ALL RESPONSES TO PHQ QUESTIONS 1-9: 16
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
## 2022-10-07 NOTE — PATIENT INSTRUCTIONS
For Fibromyalgia: O'K to continue Amitriptyline at bed time. Add Cymbalta 20 mg in am, if tolerating it well then after 2 weeks increase dose to 40 mg.

## 2022-10-23 ENCOUNTER — MYC MEDICAL ADVICE (OUTPATIENT)
Dept: INTERNAL MEDICINE | Facility: CLINIC | Age: 72
End: 2022-10-23

## 2022-10-23 DIAGNOSIS — N93.9 VAGINAL SPOTTING: Primary | ICD-10-CM

## 2022-11-30 ENCOUNTER — TRANSFERRED RECORDS (OUTPATIENT)
Dept: HEALTH INFORMATION MANAGEMENT | Facility: CLINIC | Age: 72
End: 2022-11-30

## 2022-12-05 ENCOUNTER — TRANSFERRED RECORDS (OUTPATIENT)
Dept: HEALTH INFORMATION MANAGEMENT | Facility: CLINIC | Age: 72
End: 2022-12-05

## 2022-12-06 ENCOUNTER — VIRTUAL VISIT (OUTPATIENT)
Dept: PSYCHOLOGY | Facility: CLINIC | Age: 72
End: 2022-12-06
Attending: INTERNAL MEDICINE
Payer: COMMERCIAL

## 2022-12-06 DIAGNOSIS — F41.9 ANXIETY: ICD-10-CM

## 2022-12-06 DIAGNOSIS — F33.1 MODERATE EPISODE OF RECURRENT MAJOR DEPRESSIVE DISORDER (H): ICD-10-CM

## 2022-12-06 PROCEDURE — 90834 PSYTX W PT 45 MINUTES: CPT | Mod: 95 | Performed by: SOCIAL WORKER

## 2022-12-06 PROCEDURE — 90785 PSYTX COMPLEX INTERACTIVE: CPT | Mod: 95 | Performed by: SOCIAL WORKER

## 2022-12-06 ASSESSMENT — PATIENT HEALTH QUESTIONNAIRE - PHQ9
SUM OF ALL RESPONSES TO PHQ QUESTIONS 1-9: 5
SUM OF ALL RESPONSES TO PHQ QUESTIONS 1-9: 5
10. IF YOU CHECKED OFF ANY PROBLEMS, HOW DIFFICULT HAVE THESE PROBLEMS MADE IT FOR YOU TO DO YOUR WORK, TAKE CARE OF THINGS AT HOME, OR GET ALONG WITH OTHER PEOPLE: SOMEWHAT DIFFICULT
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
## 2022-12-14 DIAGNOSIS — M81.0 AGE-RELATED OSTEOPOROSIS WITHOUT CURRENT PATHOLOGICAL FRACTURE: ICD-10-CM

## 2023-02-14 ENCOUNTER — TRANSFERRED RECORDS (OUTPATIENT)
Dept: HEALTH INFORMATION MANAGEMENT | Facility: CLINIC | Age: 73
End: 2023-02-14

## 2023-02-21 ENCOUNTER — TELEPHONE (OUTPATIENT)
Dept: INTERNAL MEDICINE | Facility: CLINIC | Age: 73
End: 2023-02-21
Payer: COMMERCIAL

## 2023-02-21 NOTE — TELEPHONE ENCOUNTER
February 21, 2023    [facility] Forms: Home health care orders [facility] Case # [id number] were received via fax for [facility] Providers: Dr. Evans to sign.  Patient label was attached to paperwork and placed in provider's inbox to be signed.    Jordan Gu

## 2023-02-28 NOTE — TELEPHONE ENCOUNTER
February 28, 2023    [facility] Forms: [facility] Plan of Care received from outbox of [facility] Providers: Dr. Evans.  Paperwork has been reviewed and is complete.  Per initial initial request, this was sent via fax to [phone].     Jordan Gu

## 2023-03-09 DIAGNOSIS — M81.0 AGE-RELATED OSTEOPOROSIS WITHOUT CURRENT PATHOLOGICAL FRACTURE: ICD-10-CM

## 2023-03-10 NOTE — TELEPHONE ENCOUNTER
Routing refill request to provider for review/approval because:  Drug not on the [facility] refill protocol     TYMLOS 3120 MCG/1.56ML SOPN injection  Last Written Prescription Date:  7/7/22  Last Fill Quantity: 1.56 mL,  # refills: 12   Last office visit provider:  10/7/22     Requested Prescriptions   Pending Prescriptions Disp Refills     TYMLOS 3120 MCG/1.56ML SOPN injection 1.56 mL 12     Sig: Inject 0.04 mLs (80 mcg) Subcutaneous daily       There is no refill protocol information for this order          TAISHA PAGE RN 03/10/23 3:43 PM

## 2023-03-11 RX ORDER — ABALOPARATIDE 2000 UG/ML
80 INJECTION, SOLUTION SUBCUTANEOUS DAILY
Qty: 1.56 ML | Refills: 12 | OUTPATIENT
Start: 2023-03-11

## 2023-03-11 NOTE — TELEPHONE ENCOUNTER
Please let pt know,  I'm not familiar with Tymlos medication.  Looks like previous Rx in July had 12 refills on it.  She should follow up with endocrinology and with our clinical pharmacist.  Dr BENTON

## 2023-03-13 NOTE — TELEPHONE ENCOUNTER
Spoke with patient, she was driving and said that she will call back the clinic when she is able.     When patient calls back please let her know about the message from Dr. Evans regarding her medication refill request.

## 2023-03-27 ENCOUNTER — OFFICE VISIT (OUTPATIENT)
Dept: INTERNAL MEDICINE | Facility: CLINIC | Age: 73
End: 2023-03-27
Payer: COMMERCIAL

## 2023-03-27 VITALS
BODY MASS INDEX: 23.54 KG/M2 | DIASTOLIC BLOOD PRESSURE: 68 MMHG | OXYGEN SATURATION: 99 % | RESPIRATION RATE: 11 BRPM | TEMPERATURE: 97.9 F | SYSTOLIC BLOOD PRESSURE: 112 MMHG | WEIGHT: 150 LBS | HEIGHT: 67 IN | HEART RATE: 96 BPM

## 2023-03-27 DIAGNOSIS — I44.7 LEFT BUNDLE BRANCH BLOCK: ICD-10-CM

## 2023-03-27 DIAGNOSIS — E55.9 VITAMIN D DEFICIENCY: ICD-10-CM

## 2023-03-27 DIAGNOSIS — Z01.818 PRE-OP EXAM: Primary | ICD-10-CM

## 2023-03-27 DIAGNOSIS — M81.0 OSTEOPOROSIS WITHOUT CURRENT PATHOLOGICAL FRACTURE, UNSPECIFIED OSTEOPOROSIS TYPE: ICD-10-CM

## 2023-03-27 DIAGNOSIS — E78.5 HYPERLIPIDEMIA LDL GOAL <130: ICD-10-CM

## 2023-03-27 DIAGNOSIS — H69.93 DYSFUNCTION OF BOTH EUSTACHIAN TUBES: ICD-10-CM

## 2023-03-27 DIAGNOSIS — F33.1 MODERATE EPISODE OF RECURRENT MAJOR DEPRESSIVE DISORDER (H): ICD-10-CM

## 2023-03-27 DIAGNOSIS — Z12.31 VISIT FOR SCREENING MAMMOGRAM: ICD-10-CM

## 2023-03-27 LAB
ALBUMIN SERPL BCG-MCNC: 4.4 G/DL (ref 3.5–5.2)
ALP SERPL-CCNC: 55 U/L (ref 35–104)
ALT SERPL W P-5'-P-CCNC: 17 U/L (ref 10–35)
ANION GAP SERPL CALCULATED.3IONS-SCNC: 13 MMOL/L (ref 7–15)
AST SERPL W P-5'-P-CCNC: 29 U/L (ref 10–35)
ATRIAL RATE - MUSE: 78 BPM
BASOPHILS # BLD AUTO: 0 10E3/UL (ref 0–0.2)
BASOPHILS NFR BLD AUTO: 1 %
BILIRUB SERPL-MCNC: 0.5 MG/DL
BUN SERPL-MCNC: 15.1 MG/DL (ref 8–23)
CALCIUM SERPL-MCNC: 9.5 MG/DL (ref 8.8–10.2)
CHLORIDE SERPL-SCNC: 100 MMOL/L (ref 98–107)
CHOLEST SERPL-MCNC: 284 MG/DL
CREAT SERPL-MCNC: 0.9 MG/DL (ref 0.51–0.95)
DEPRECATED HCO3 PLAS-SCNC: 25 MMOL/L (ref 22–29)
DIASTOLIC BLOOD PRESSURE - MUSE: NORMAL MMHG
EOSINOPHIL # BLD AUTO: 0.1 10E3/UL (ref 0–0.7)
EOSINOPHIL NFR BLD AUTO: 2 %
ERYTHROCYTE [DISTWIDTH] IN BLOOD BY AUTOMATED COUNT: 13.3 % (ref 10–15)
GFR SERPL CREATININE-BSD FRML MDRD: 68 ML/MIN/1.73M2
GLUCOSE SERPL-MCNC: 95 MG/DL (ref 70–99)
HCT VFR BLD AUTO: 46.9 % (ref 35–47)
HDLC SERPL-MCNC: 89 MG/DL
HGB BLD-MCNC: 15.3 G/DL (ref 11.7–15.7)
IMM GRANULOCYTES # BLD: 0 10E3/UL
IMM GRANULOCYTES NFR BLD: 0 %
INTERPRETATION ECG - MUSE: NORMAL
LDLC SERPL CALC-MCNC: 181 MG/DL
LYMPHOCYTES # BLD AUTO: 1.5 10E3/UL (ref 0.8–5.3)
LYMPHOCYTES NFR BLD AUTO: 30 %
MCH RBC QN AUTO: 32.8 PG (ref 26.5–33)
MCHC RBC AUTO-ENTMCNC: 32.6 G/DL (ref 31.5–36.5)
MCV RBC AUTO: 101 FL (ref 78–100)
MONOCYTES # BLD AUTO: 0.3 10E3/UL (ref 0–1.3)
MONOCYTES NFR BLD AUTO: 7 %
NEUTROPHILS # BLD AUTO: 3 10E3/UL (ref 1.6–8.3)
NEUTROPHILS NFR BLD AUTO: 61 %
NONHDLC SERPL-MCNC: 195 MG/DL
P AXIS - MUSE: 68 DEGREES
PLATELET # BLD AUTO: 284 10E3/UL (ref 150–450)
POTASSIUM SERPL-SCNC: 4 MMOL/L (ref 3.4–5.3)
PR INTERVAL - MUSE: 156 MS
PROT SERPL-MCNC: 7.6 G/DL (ref 6.4–8.3)
QRS DURATION - MUSE: 138 MS
QT - MUSE: 428 MS
QTC - MUSE: 487 MS
R AXIS - MUSE: 13 DEGREES
RBC # BLD AUTO: 4.66 10E6/UL (ref 3.8–5.2)
SODIUM SERPL-SCNC: 138 MMOL/L (ref 136–145)
SYSTOLIC BLOOD PRESSURE - MUSE: NORMAL MMHG
T AXIS - MUSE: 149 DEGREES
TRIGL SERPL-MCNC: 69 MG/DL
TSH SERPL DL<=0.005 MIU/L-ACNC: 2.92 UIU/ML (ref 0.3–4.2)
VENTRICULAR RATE- MUSE: 78 BPM
WBC # BLD AUTO: 5 10E3/UL (ref 4–11)

## 2023-03-27 PROCEDURE — 99214 OFFICE O/P EST MOD 30 MIN: CPT | Performed by: INTERNAL MEDICINE

## 2023-03-27 PROCEDURE — 84443 ASSAY THYROID STIM HORMONE: CPT | Performed by: INTERNAL MEDICINE

## 2023-03-27 PROCEDURE — 85025 COMPLETE CBC W/AUTO DIFF WBC: CPT | Performed by: INTERNAL MEDICINE

## 2023-03-27 PROCEDURE — 93005 ELECTROCARDIOGRAM TRACING: CPT | Performed by: INTERNAL MEDICINE

## 2023-03-27 PROCEDURE — 93010 ELECTROCARDIOGRAM REPORT: CPT | Performed by: INTERNAL MEDICINE

## 2023-03-27 PROCEDURE — 36415 COLL VENOUS BLD VENIPUNCTURE: CPT | Performed by: INTERNAL MEDICINE

## 2023-03-27 PROCEDURE — 80061 LIPID PANEL: CPT | Performed by: INTERNAL MEDICINE

## 2023-03-27 PROCEDURE — 82306 VITAMIN D 25 HYDROXY: CPT | Performed by: INTERNAL MEDICINE

## 2023-03-27 PROCEDURE — 80053 COMPREHEN METABOLIC PANEL: CPT | Performed by: INTERNAL MEDICINE

## 2023-03-27 RX ORDER — BUPROPION HYDROCHLORIDE 75 MG/1
75 TABLET ORAL PRN
COMMUNITY
Start: 2023-02-08 | End: 2024-01-11

## 2023-03-27 RX ORDER — BUPROPION HYDROCHLORIDE 100 MG/1
100 TABLET, EXTENDED RELEASE ORAL DAILY
COMMUNITY
Start: 2023-02-28

## 2023-03-27 NOTE — PATIENT INSTRUCTIONS
Covid screen only  if surgeon requires    2. Fasting labs today    3. Schedule mammogram    4. EKG today.     5. See endocrinology for osteoporosis management.    6. Call with information on pre-op fax/facility.
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
## 2023-03-28 ENCOUNTER — MYC MEDICAL ADVICE (OUTPATIENT)
Dept: INTERNAL MEDICINE | Facility: CLINIC | Age: 73
End: 2023-03-28
Payer: COMMERCIAL

## 2023-03-28 ENCOUNTER — TELEPHONE (OUTPATIENT)
Dept: ENDOCRINOLOGY | Facility: CLINIC | Age: 73
End: 2023-03-28
Payer: COMMERCIAL

## 2023-03-28 LAB — DEPRECATED CALCIDIOL+CALCIFEROL SERPL-MC: 81 UG/L (ref 20–75)

## 2023-03-28 NOTE — TELEPHONE ENCOUNTER
My chart sent with number to call to schedule in Endocrine  for Osteoporosis . New referral  Is scheduled first available but one of the clinic may have something in her time frame  . The drug  Tymlos is well known  for providers Mily Noel RN on 3/28/2023 at 3:48 PM

## 2023-03-28 NOTE — TELEPHONE ENCOUNTER
[facility] Call [facility]    Phone Message    May a detailed message be left on voicemail: yes     Reason for Call: Other: Pt has an referral for Osteoporosis. Per pt previous Endocrine docotor has retire and is looking for a provider who deals with the injection treatment called TYMLOS. Per pt has gained bone growth and would like to seal it up soon which would be about 2 years soon. Per pt would like something  mayb within 1-2 months from now since she would be traveling soon and may not come back . Per pt is asking so she doesnt waste her time if none of the doctors know the medication TYMLOS. please call pt back to discuss. thank you!     Action Taken: Message routed to:  [facility] ENDO    Travel Screening: Not Applicable

## 2023-04-06 ENCOUNTER — MYC MEDICAL ADVICE (OUTPATIENT)
Dept: ENDOCRINOLOGY | Facility: CLINIC | Age: 73
End: 2023-04-06
Payer: COMMERCIAL

## 2023-04-07 ENCOUNTER — MYC MEDICAL ADVICE (OUTPATIENT)
Dept: INTERNAL MEDICINE | Facility: CLINIC | Age: 73
End: 2023-04-07
Payer: COMMERCIAL

## 2023-04-12 ENCOUNTER — MYC MEDICAL ADVICE (OUTPATIENT)
Dept: INTERNAL MEDICINE | Facility: CLINIC | Age: 73
End: 2023-04-12
Payer: COMMERCIAL

## 2023-04-12 DIAGNOSIS — R94.31 ABNORMAL ELECTROCARDIOGRAM: ICD-10-CM

## 2023-04-12 DIAGNOSIS — M81.0 OSTEOPOROSIS WITHOUT CURRENT PATHOLOGICAL FRACTURE, UNSPECIFIED OSTEOPOROSIS TYPE: ICD-10-CM

## 2023-04-12 DIAGNOSIS — E78.5 HYPERLIPIDEMIA LDL GOAL <130: Primary | ICD-10-CM

## 2023-04-12 DIAGNOSIS — I44.7 LEFT BUNDLE BRANCH BLOCK: ICD-10-CM

## 2023-04-16 ENCOUNTER — HEALTH MAINTENANCE LETTER (OUTPATIENT)
Age: 73
End: 2023-04-16

## 2023-04-17 ENCOUNTER — ANCILLARY PROCEDURE (OUTPATIENT)
Dept: BONE DENSITY | Facility: CLINIC | Age: 73
End: 2023-04-17
Payer: COMMERCIAL

## 2023-04-17 DIAGNOSIS — Z79.899 HIGH RISK MEDICATION USE: ICD-10-CM

## 2023-04-17 DIAGNOSIS — M81.0 AGE-RELATED OSTEOPOROSIS WITHOUT CURRENT PATHOLOGICAL FRACTURE: ICD-10-CM

## 2023-04-17 PROCEDURE — 77080 DXA BONE DENSITY AXIAL: CPT | Mod: TC | Performed by: RADIOLOGY

## 2023-04-25 ENCOUNTER — ANCILLARY PROCEDURE (OUTPATIENT)
Dept: MAMMOGRAPHY | Facility: CLINIC | Age: 73
End: 2023-04-25
Attending: INTERNAL MEDICINE
Payer: COMMERCIAL

## 2023-04-25 DIAGNOSIS — Z12.31 VISIT FOR SCREENING MAMMOGRAM: ICD-10-CM

## 2023-04-25 PROCEDURE — 77063 BREAST TOMOSYNTHESIS BI: CPT | Performed by: RADIOLOGY

## 2023-04-25 PROCEDURE — 77067 SCR MAMMO BI INCL CAD: CPT | Performed by: RADIOLOGY

## 2023-05-10 ENCOUNTER — NURSE TRIAGE (OUTPATIENT)
Dept: NURSING | Facility: CLINIC | Age: 73
End: 2023-05-10
Payer: COMMERCIAL

## 2023-05-11 ENCOUNTER — VIRTUAL VISIT (OUTPATIENT)
Dept: INTERNAL MEDICINE | Facility: CLINIC | Age: 73
End: 2023-05-11
Payer: COMMERCIAL

## 2023-05-11 DIAGNOSIS — Z00.00 PREVENTATIVE HEALTH CARE: ICD-10-CM

## 2023-05-11 DIAGNOSIS — M79.7 FIBROMYALGIA: ICD-10-CM
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
## 2023-05-11 PROCEDURE — 99443 PR PHYSICIAN TELEPHONE EVALUATION 21-30 MIN: CPT | Mod: 95 | Performed by: INTERNAL MEDICINE

## 2023-05-11 RX ORDER — CHOLECALCIFEROL (VITAMIN D3) 50 MCG
1 TABLET ORAL DAILY
COMMUNITY

## 2023-05-11 RX ORDER — UBIDECARENONE 100 MG
100 CAPSULE ORAL EVERY OTHER DAY
COMMUNITY

## 2023-05-11 RX ORDER — UBIDECARENONE 30 MG
550 CAPSULE ORAL
COMMUNITY

## 2023-05-11 ASSESSMENT — PATIENT HEALTH QUESTIONNAIRE - PHQ9
SUM OF ALL RESPONSES TO PHQ QUESTIONS 1-9: 2
10. IF YOU CHECKED OFF ANY PROBLEMS, HOW DIFFICULT HAVE THESE PROBLEMS MADE IT FOR YOU TO DO YOUR WORK, TAKE CARE OF THINGS AT HOME, OR GET ALONG WITH OTHER PEOPLE: SOMEWHAT DIFFICULT
SUM OF ALL RESPONSES TO PHQ QUESTIONS 1-9: 2

## 2023-05-11 NOTE — PATIENT INSTRUCTIONS
X-ray of right hip and right wrist ordered if symptoms worsen    Ibuprofen 200 to 400 mg twice daily with food for the next week    Tylenol as needed

## 2023-05-11 NOTE — TELEPHONE ENCOUNTER
Nurse Triage SBAR    Is this a 2nd Level Triage? NO    Situation: Pt called after a fall with numerous injuries.    Background: Verónica states she fell around 1830 today down 4-5 steps onto her walkway.     Assessment: Verónica states she fell down her stairs onto her right side while working outside this evening after tripping over herself. She did not hit her head. No LOC. She is not on any blood thinners. She is c/o wrist, shoulder, hip, and ankle pain. She is able to walk with some discomfort. She expresses bruising and swelling.    Protocol Recommended Disposition:   Go to ED Now    Recommendation: Encouraged to go to the ER for numerous injuries. Pt does not feel this is neccessary and states she will call her clinic in the morning to see if they can fit her into their schedule.     Reason for Disposition    Sounds like a serious injury to the triager    Injury (or injuries) that need emergency care    Additional Information    Negative: Major injury from dangerous force (e.g., fall > 10 feet or 3 meters)    Negative: [1] Major bleeding (e.g., actively dripping or spurting) AND [2] can't be stopped    Negative: Shock suspected (e.g., cold/pale/clammy skin, too weak to stand)    Negative: Sounds like a life-threatening emergency to the triager    Negative: Difficult to awaken or acting confused (e.g., disoriented, slurred speech)    Negative: [1] SEVERE weakness (i.e., unable to walk or barely able to walk, requires support) AND [2] new-onset or worsening    Negative: [1] Can't stand (bear weight) or walk AND [2] new-onset after fall    Protocols used: FALLS AND [id number] Giraldo [facility] Nurse Advisor   5/10/2023  8:08 PM
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
## 2023-05-16 ENCOUNTER — ANCILLARY ORDERS (OUTPATIENT)
Dept: INTERNAL MEDICINE | Facility: CLINIC | Age: 73
End: 2023-05-16

## 2023-05-16 ENCOUNTER — HOSPITAL ENCOUNTER (OUTPATIENT)
Dept: CT IMAGING | Facility: CLINIC | Age: 73
Discharge: HOME | End: 2023-05-16
Attending: INTERNAL MEDICINE | Admitting: INTERNAL MEDICINE
Payer: COMMERCIAL

## 2023-05-16 DIAGNOSIS — I44.7 LEFT BUNDLE BRANCH BLOCK: ICD-10-CM

## 2023-05-16 DIAGNOSIS — R94.31 ABNORMAL ELECTROCARDIOGRAM: ICD-10-CM

## 2023-05-16 DIAGNOSIS — E78.5 HYPERLIPIDEMIA LDL GOAL <130: ICD-10-CM

## 2023-05-16 LAB
CV CALCIUM SCORE AGATSTON LM: 0
CV CALCIUM SCORING AGATSON LAD: 121
CV CALCIUM SCORING AGATSTON CX: 0
CV CALCIUM SCORING AGATSTON RCA: 0
CV CALCIUM SCORING AGATSTON TOTAL: 121

## 2023-05-16 PROCEDURE — 75571 CT HRT W/O DYE W/CA TEST: CPT

## 2023-05-16 PROCEDURE — 75571 CT HRT W/O DYE W/CA TEST: CPT | Mod: 26 | Performed by: INTERNAL MEDICINE

## 2023-05-21 ENCOUNTER — MYC MEDICAL ADVICE (OUTPATIENT)
Dept: INTERNAL MEDICINE | Facility: CLINIC | Age: 73
End: 2023-05-21
Payer: COMMERCIAL

## 2023-05-21 DIAGNOSIS — E78.5 HYPERLIPIDEMIA LDL GOAL <100: ICD-10-CM

## 2023-05-21 DIAGNOSIS — I42.9 CARDIOMYOPATHY, UNSPECIFIED TYPE (H): Primary | ICD-10-CM

## 2023-05-22 ENCOUNTER — HOSPITAL ENCOUNTER (OUTPATIENT)
Dept: CARDIOLOGY | Facility: HOSPITAL | Age: 73
Discharge: HOME | End: 2023-05-22
Attending: INTERNAL MEDICINE | Admitting: INTERNAL MEDICINE
Payer: COMMERCIAL

## 2023-05-22 DIAGNOSIS — R94.31 ABNORMAL ELECTROCARDIOGRAM: ICD-10-CM

## 2023-05-22 DIAGNOSIS — I44.7 LEFT BUNDLE BRANCH BLOCK: ICD-10-CM

## 2023-05-22 LAB — LVEF ECHO: NORMAL

## 2023-05-22 PROCEDURE — 93306 TTE W/DOPPLER COMPLETE: CPT | Mod: 26 | Performed by: INTERNAL MEDICINE

## 2023-05-22 PROCEDURE — 93306 TTE W/DOPPLER COMPLETE: CPT

## 2023-05-26 RX ORDER — ROSUVASTATIN CALCIUM 10 MG/1
10 TABLET, COATED ORAL DAILY
Qty: 90 TABLET | Refills: 3 | Status: SHIPPED | OUTPATIENT
Start: 2023-05-26 | End: 2024-05-17

## 2023-06-01 ENCOUNTER — OFFICE VISIT (OUTPATIENT)
Dept: INTERNAL MEDICINE | Facility: CLINIC | Age: 73
End: 2023-06-01
Payer: COMMERCIAL

## 2023-06-01 VITALS
TEMPERATURE: 98.7 F | DIASTOLIC BLOOD PRESSURE: 78 MMHG | WEIGHT: 150.4 LBS | OXYGEN SATURATION: 98 % | BODY MASS INDEX: 23.61 KG/M2 | HEART RATE: 88 BPM | SYSTOLIC BLOOD PRESSURE: 120 MMHG | HEIGHT: 67 IN | RESPIRATION RATE: 14 BRPM

## 2023-06-01 DIAGNOSIS — I42.9 CARDIOMYOPATHY, UNSPECIFIED TYPE (H): ICD-10-CM

## 2023-06-01 DIAGNOSIS — Z00.00 ENCOUNTER FOR ANNUAL WELLNESS EXAM IN MEDICARE PATIENT: Primary | ICD-10-CM

## 2023-06-01 DIAGNOSIS — E78.5 HYPERLIPIDEMIA LDL GOAL <100: ICD-10-CM

## 2023-06-01 DIAGNOSIS — R07.9 CHEST PAIN, UNSPECIFIED TYPE: ICD-10-CM

## 2023-06-01 DIAGNOSIS — F33.1 MODERATE EPISODE OF RECURRENT MAJOR DEPRESSIVE DISORDER (H): ICD-10-CM

## 2023-06-01 DIAGNOSIS — M81.0 OSTEOPOROSIS WITHOUT CURRENT PATHOLOGICAL FRACTURE, UNSPECIFIED OSTEOPOROSIS TYPE: ICD-10-CM

## 2023-06-01 DIAGNOSIS — J30.1 SEASONAL ALLERGIC RHINITIS DUE TO POLLEN: ICD-10-CM

## 2023-06-01 PROCEDURE — 99214 OFFICE O/P EST MOD 30 MIN: CPT | Mod: 25 | Performed by: INTERNAL MEDICINE

## 2023-06-01 PROCEDURE — G0439 PPPS, SUBSEQ VISIT: HCPCS | Performed by: INTERNAL MEDICINE

## 2023-06-01 RX ORDER — ASPIRIN 81 MG/1
81 TABLET ORAL DAILY
COMMUNITY
Start: 2023-06-01

## 2023-06-01 ASSESSMENT — ENCOUNTER SYMPTOMS
SHORTNESS OF BREATH: 0
HEARTBURN: 0
FREQUENCY: 0
SORE THROAT: 0
DIZZINESS: 0
WEAKNESS: 0
CHILLS: 0
ABDOMINAL PAIN: 0
NAUSEA: 0
EYE PAIN: 1
BREAST MASS: 0
CONSTIPATION: 1
HEMATOCHEZIA: 0
PALPITATIONS: 0
COUGH: 0
DIARRHEA: 0
MYALGIAS: 0
ARTHRALGIAS: 1
NERVOUS/ANXIOUS: 0
DYSURIA: 0
HEADACHES: 1
JOINT SWELLING: 0
FEVER: 0
PARESTHESIAS: 0

## 2023-06-01 ASSESSMENT — PATIENT HEALTH QUESTIONNAIRE - PHQ9
10. IF YOU CHECKED OFF ANY PROBLEMS, HOW DIFFICULT HAVE THESE PROBLEMS MADE IT FOR YOU TO DO YOUR WORK, TAKE CARE OF THINGS AT HOME, OR GET ALONG WITH OTHER PEOPLE: NOT DIFFICULT AT ALL
SUM OF ALL RESPONSES TO PHQ QUESTIONS 1-9: 6
SUM OF ALL RESPONSES TO PHQ QUESTIONS 1-9: 6

## 2023-06-01 ASSESSMENT — ACTIVITIES OF DAILY LIVING (ADL): CURRENT_FUNCTION: NO ASSISTANCE NEEDED

## 2023-06-01 NOTE — PATIENT INSTRUCTIONS
Avoid moderate exercise until you see cardiology.     2. Continue Crestor, add aspirin 81 mg enteric coated.    3. Get covid booster now    4. Schedule stress test.
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
## 2023-06-13 ENCOUNTER — OFFICE VISIT (OUTPATIENT)
Dept: CARDIOLOGY | Facility: CLINIC | Age: 73
End: 2023-06-13
Payer: COMMERCIAL

## 2023-06-13 ENCOUNTER — LAB (OUTPATIENT)
Dept: CARDIOLOGY | Facility: CLINIC | Age: 73
End: 2023-06-13
Payer: COMMERCIAL

## 2023-06-13 VITALS
DIASTOLIC BLOOD PRESSURE: 78 MMHG | HEART RATE: 74 BPM | RESPIRATION RATE: 16 BRPM | HEIGHT: 67 IN | BODY MASS INDEX: 23.54 KG/M2 | WEIGHT: 150 LBS | SYSTOLIC BLOOD PRESSURE: 110 MMHG

## 2023-06-13 DIAGNOSIS — I42.9 CARDIOMYOPATHY, UNSPECIFIED TYPE (H): Primary | ICD-10-CM

## 2023-06-13 DIAGNOSIS — M81.0 OSTEOPOROSIS WITHOUT CURRENT PATHOLOGICAL FRACTURE, UNSPECIFIED OSTEOPOROSIS TYPE: ICD-10-CM

## 2023-06-13 LAB
ANION GAP SERPL CALCULATED.3IONS-SCNC: 11 MMOL/L (ref 7–15)
BUN SERPL-MCNC: 13 MG/DL (ref 8–23)
CALCIUM SERPL-MCNC: 9 MG/DL (ref 8.8–10.2)
CHLORIDE SERPL-SCNC: 102 MMOL/L (ref 98–107)
CREAT SERPL-MCNC: 0.74 MG/DL (ref 0.51–0.95)
DEPRECATED HCO3 PLAS-SCNC: 24 MMOL/L (ref 22–29)
GFR SERPL CREATININE-BSD FRML MDRD: 85 ML/MIN/1.73M2
GLUCOSE SERPL-MCNC: 86 MG/DL (ref 70–99)
POTASSIUM SERPL-SCNC: 3.8 MMOL/L (ref 3.4–5.3)
SODIUM SERPL-SCNC: 137 MMOL/L (ref 136–145)

## 2023-06-13 PROCEDURE — 80048 BASIC METABOLIC PNL TOTAL CA: CPT

## 2023-06-13 PROCEDURE — 99204 OFFICE O/P NEW MOD 45 MIN: CPT | Performed by: INTERNAL MEDICINE

## 2023-06-13 PROCEDURE — 36415 COLL VENOUS BLD VENIPUNCTURE: CPT
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
## 2023-06-13 NOTE — PATIENT INSTRUCTIONS
Thank you for allowing the Heart Care clinic to be a part of your care. Please pay close attention to the following medications as they have been changed during this visit.    1.) Please start taking metoprolol succinate 25 mg daily.
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
## 2023-06-13 NOTE — PROGRESS NOTES
Patient seen in clinic for HF education s/p recent hospital discharge 0-.  Reviewed HF Binder that includes the  HF Sx Awareness & and Weight log booklet highlighting :  __X_patient s type of heart failure _X__Na management in diet  __X_importance of daily wts  _X__Fluid Guidelines, if applicable  __X_medication review and importance of compliance     Instructed patient in signs and sx of heart failure, reiterated when to call clinic - reviewed HF hotline # [phone] and after hours call # [phone].  Majority of time was spent reviewinmins  Patient verbalized understanding of HF discussion.  Plan for f/u with continued HF education reviewed.  No formal f/u scheduled with nurse clinician for continued education - will continue to reinforce HF management education.        Mark DIAZ RN  BSN

## 2023-06-16 ENCOUNTER — TELEPHONE (OUTPATIENT)
Dept: CARDIOLOGY | Facility: CLINIC | Age: 73
End: 2023-06-16
Payer: COMMERCIAL

## 2023-06-16 DIAGNOSIS — I50.20 HEART FAILURE WITH REDUCED EJECTION FRACTION, NYHA CLASS I (H): Primary | ICD-10-CM

## 2023-06-16 RX ORDER — METOPROLOL SUCCINATE 25 MG/1
25 TABLET, EXTENDED RELEASE ORAL DAILY
Qty: 60 TABLET | Refills: 1 | Status: SHIPPED | OUTPATIENT
Start: 2023-06-16 | End: 2023-10-23

## 2023-06-16 NOTE — TELEPHONE ENCOUNTER
Metoprolol Succinate 25mg once daily orders are placed, as per Dr. Dany CLARK RN BSN, CHFN, PCCN-K

## 2023-06-23 ENCOUNTER — TELEPHONE (OUTPATIENT)
Dept: CARDIOLOGY | Facility: CLINIC | Age: 73
End: 2023-06-23
Payer: COMMERCIAL

## 2023-06-23 NOTE — TELEPHONE ENCOUNTER
Pt calls to ask if she should wait until after stress test to start metoprolol. Informed she doesn't have to wait and should start taking it now.    Mark DIAZ RN  BSN

## 2023-06-27 ENCOUNTER — HOSPITAL ENCOUNTER (OUTPATIENT)
Dept: NUCLEAR MEDICINE | Facility: HOSPITAL | Age: 73
Discharge: HOME | End: 2023-06-27
Attending: INTERNAL MEDICINE
Payer: COMMERCIAL

## 2023-06-27 ENCOUNTER — HOSPITAL ENCOUNTER (OUTPATIENT)
Dept: CARDIOLOGY | Facility: HOSPITAL | Age: 73
Discharge: HOME | End: 2023-06-27
Attending: INTERNAL MEDICINE
Payer: COMMERCIAL

## 2023-06-27 DIAGNOSIS — I42.9 CARDIOMYOPATHY, UNSPECIFIED TYPE (H): ICD-10-CM

## 2023-06-27 DIAGNOSIS — R07.9 CHEST PAIN, UNSPECIFIED TYPE: ICD-10-CM

## 2023-06-27 LAB
CV STRESS CURRENT BP HE: NORMAL
CV STRESS CURRENT HR HE: 56
CV STRESS CURRENT HR HE: 60
CV STRESS CURRENT HR HE: 64
CV STRESS CURRENT HR HE: 84
CV STRESS CURRENT HR HE: 84
CV STRESS CURRENT HR HE: 85
CV STRESS CURRENT HR HE: 87
CV STRESS CURRENT HR HE: 88
CV STRESS CURRENT HR HE: 89
CV STRESS CURRENT HR HE: 90
CV STRESS CURRENT HR HE: 91
CV STRESS CURRENT HR HE: 91
CV STRESS CURRENT HR HE: 95
CV STRESS CURRENT HR HE: 96
CV STRESS DEVIATION TIME HE: NORMAL
CV STRESS ECHO PERCENT HR HE: NORMAL
CV STRESS EXERCISE STAGE HE: NORMAL
CV STRESS FINAL RESTING BP HE: NORMAL
CV STRESS FINAL RESTING HR HE: 84
CV STRESS MAX HR HE: 96
CV STRESS MAX TREADMILL GRADE HE: 0
CV STRESS MAX TREADMILL SPEED HE: 0
CV STRESS PEAK DIA BP HE: NORMAL
CV STRESS PEAK SYS BP HE: NORMAL
CV STRESS PHASE HE: NORMAL
CV STRESS PROTOCOL HE: NORMAL
CV STRESS RESTING PT POSITION HE: NORMAL
CV STRESS ST DEVIATION AMOUNT HE: NORMAL
CV STRESS ST DEVIATION ELEVATION HE: NORMAL
CV STRESS ST EVELATION AMOUNT HE: NORMAL
CV STRESS TEST TYPE HE: NORMAL
CV STRESS TOTAL STAGE TIME MIN 1 HE: NORMAL
NUC STRESS EJECTION FRACTION: 59 %
RATE PRESSURE PRODUCT: NORMAL
STRESS ECHO BASELINE DIASTOLIC HE: 62
STRESS ECHO BASELINE HR: 56
STRESS ECHO BASELINE SYSTOLIC BP: 122
STRESS ECHO CALCULATED PERCENT HR: 65 %
STRESS ECHO LAST STRESS DIASTOLIC BP: 68
STRESS ECHO LAST STRESS HR: 91
STRESS ECHO LAST STRESS SYSTOLIC BP: 132
STRESS ECHO TARGET HR: 148

## 2023-06-27 PROCEDURE — 78452 HT MUSCLE IMAGE SPECT MULT: CPT

## 2023-06-27 PROCEDURE — A9500 TC99M SESTAMIBI: HCPCS | Performed by: INTERNAL MEDICINE

## 2023-06-27 PROCEDURE — 343N000001 HC RX 343: Performed by: INTERNAL MEDICINE

## 2023-06-27 PROCEDURE — 93018 CV STRESS TEST I&R ONLY: CPT | Performed by: INTERNAL MEDICINE

## 2023-06-27 PROCEDURE — 250N000011 HC RX IP 250 OP 636: Mod: JZ | Performed by: INTERNAL MEDICINE

## 2023-06-27 PROCEDURE — 93016 CV STRESS TEST SUPVJ ONLY: CPT | Performed by: INTERNAL MEDICINE

## 2023-06-27 PROCEDURE — 78452 HT MUSCLE IMAGE SPECT MULT: CPT | Mod: 26 | Performed by: INTERNAL MEDICINE

## 2023-06-27 PROCEDURE — 93017 CV STRESS TEST TRACING ONLY: CPT

## 2023-06-27 RX ORDER — CAFFEINE CITRATE 20 MG/ML
60 SOLUTION INTRAVENOUS
Status: DISCONTINUED | OUTPATIENT
Start: 2023-06-27 | End: 2023-06-27 | Stop reason: HOSPADM

## 2023-06-27 RX ORDER — CAFFEINE 200 MG
200 TABLET ORAL
Status: DISCONTINUED | OUTPATIENT
Start: 2023-06-27 | End: 2023-06-27 | Stop reason: HOSPADM

## 2023-06-27 RX ORDER — AMINOPHYLLINE 25 MG/ML
50 INJECTION, SOLUTION INTRAVENOUS
Status: DISCONTINUED | OUTPATIENT
Start: 2023-06-27 | End: 2023-06-27 | Stop reason: HOSPADM

## 2023-06-27 RX ORDER — ALBUTEROL SULFATE 0.83 MG/ML
2.5 SOLUTION RESPIRATORY (INHALATION)
Status: DISCONTINUED | OUTPATIENT
Start: 2023-06-27 | End: 2023-06-27 | Stop reason: HOSPADM

## 2023-06-27 RX ORDER — REGADENOSON 0.08 MG/ML
0.4 INJECTION, SOLUTION INTRAVENOUS ONCE
Status: COMPLETED | OUTPATIENT
Start: 2023-06-27 | End: 2023-06-27

## 2023-06-27 RX ADMIN — Medication 8.48 MILLICURIE: at 12:07

## 2023-06-27 RX ADMIN — REGADENOSON 0.4 MG: 0.08 INJECTION, SOLUTION INTRAVENOUS at 13:31

## 2023-06-27 RX ADMIN — Medication 30.6 MILLICURIE: at 14:43

## 2023-07-10 ENCOUNTER — TELEPHONE (OUTPATIENT)
Dept: INTERNAL MEDICINE | Facility: CLINIC | Age: 73
End: 2023-07-10
Payer: COMMERCIAL

## 2023-07-10 NOTE — TELEPHONE ENCOUNTER
General Call    Contacts       Type Contact Phone/Fax    07/10/2023 02:15 PM CDT Phone (Incoming) Verónica Vernon (Self) [phone] (M)        Reason for Call:  4 different tests    What are your questions or concerns:  Patient would like for someone to talk through all of her recent tests that she's had.    Date of last appointment with provider: n/a   would you prefer to receive a phone call?:   Patient would prefer a phone call     Okay to leave a detailed message?: Yes at Cell number on file:    Telephone Information:   Mobile [phone]

## 2023-07-11 ENCOUNTER — TELEPHONE (OUTPATIENT)
Dept: INTERNAL MEDICINE | Facility: CLINIC | Age: 73
End: 2023-07-11
Payer: COMMERCIAL

## 2023-07-11 NOTE — TELEPHONE ENCOUNTER
July 11, 2023    [facility] OT Plan of Care was received via fax for Dr. Evans to sign.  Patient label was attached to paperwork and placed in provider's inbox to be signed.    Piedad Wilkerson

## 2023-07-12 NOTE — TELEPHONE ENCOUNTER
July 12, 2023    [facility] Therapy Plan of Care was picked up from outbox of Dr. Evans.  Paperwork has been reviewed and is complete.  Per initial initial request, this was sent via fax to [phone].     Piedad Wilkerson

## 2023-07-12 NOTE — TELEPHONE ENCOUNTER
Reviewed stress results letter from 06/3023 with patient in addition to Dr. Cheek 06/13/23 lab results message.Patient verbalized understanding.  No further questions at this time.

## 2023-07-26 ENCOUNTER — TELEPHONE (OUTPATIENT)
Dept: INTERNAL MEDICINE | Facility: CLINIC | Age: 73
End: 2023-07-26
Payer: COMMERCIAL

## 2023-07-26 NOTE — TELEPHONE ENCOUNTER
July 26, 2023    [facility] Plan of Care Continued was received via fax for Dr. Evans to sign.  Patient label was attached to paperwork and placed in provider's inbox to be signed.    Piedad Wilkerson

## 2023-07-31 NOTE — TELEPHONE ENCOUNTER
July 31, 2023    [facility] Plan of Care Continued was picked up from outbox of Dr. Evans.  Paperwork has been reviewed and is complete.  Per initial initial request, this was sent via fax to [phone].     Piedad Wilkerson

## 2023-10-06 ENCOUNTER — OFFICE VISIT (OUTPATIENT)
Dept: CARDIOLOGY | Facility: CLINIC | Age: 73
End: 2023-10-06
Payer: COMMERCIAL

## 2023-10-06 VITALS
BODY MASS INDEX: 23.7 KG/M2 | HEIGHT: 67 IN | DIASTOLIC BLOOD PRESSURE: 60 MMHG | RESPIRATION RATE: 16 BRPM | WEIGHT: 151 LBS | HEART RATE: 72 BPM | SYSTOLIC BLOOD PRESSURE: 102 MMHG

## 2023-10-06 DIAGNOSIS — I50.22 CHRONIC SYSTOLIC HEART FAILURE (H): Primary | ICD-10-CM

## 2023-10-06 PROCEDURE — 99215 OFFICE O/P EST HI 40 MIN: CPT | Performed by: INTERNAL MEDICINE
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
## 2023-10-23 ENCOUNTER — TELEPHONE (OUTPATIENT)
Dept: CARDIOLOGY | Facility: CLINIC | Age: 73
End: 2023-10-23
Payer: COMMERCIAL

## 2023-10-23 DIAGNOSIS — I50.20 HEART FAILURE WITH REDUCED EJECTION FRACTION, NYHA CLASS I (H): Primary | ICD-10-CM

## 2023-10-23 RX ORDER — METOPROLOL SUCCINATE 25 MG/1
25 TABLET, EXTENDED RELEASE ORAL DAILY
Qty: 90 TABLET | Refills: 4 | Status: SHIPPED | OUTPATIENT
Start: 2023-10-23 | End: 2024-02-05

## 2023-10-23 RX ORDER — METOPROLOL SUCCINATE 25 MG/1
25 TABLET, EXTENDED RELEASE ORAL DAILY
Qty: 60 TABLET | Refills: 1 | Status: SHIPPED | OUTPATIENT
Start: 2023-10-23 | End: 2023-10-23

## 2023-10-23 NOTE — TELEPHONE ENCOUNTER
[facility] Call Center    Phone Message    May a detailed message be left on voicemail: yes     Reason for Call: Medication Refill Request    Has the patient contacted the pharmacy for the refill? Yes   Name of medication being requested: metoprolol succinate ER (TOPROL XL) 25 MG 24 hr tablet   Provider who prescribed the medication: Dr. Saenz   Pharmacy:    [facility] - [place], [place] - [address]      Date medication is needed: 2 days        Action Taken: Other: Cardiology     Travel Screening: Not Applicable    Thank you!  [facility]

## 2023-11-08 ENCOUNTER — TRANSFERRED RECORDS (OUTPATIENT)
Dept: HEALTH INFORMATION MANAGEMENT | Facility: CLINIC | Age: 73
End: 2023-11-08
Payer: COMMERCIAL

## 2023-11-27 ENCOUNTER — TELEPHONE (OUTPATIENT)
Dept: INTERNAL MEDICINE | Facility: CLINIC | Age: 73
End: 2023-11-27
Payer: COMMERCIAL

## 2023-11-27 NOTE — TELEPHONE ENCOUNTER
General Call    Contacts         Type Contact Phone/Fax    11/27/2023 02:05 PM CST Phone (Incoming) Verónica Vernon (Self) [phone] (M)          Reason for Call: Pt wants refill quantity to be more    What are your questions or concerns:  [place] and needs   rosuvastatin (CRESTOR) 10 MG tablet 90 tablet 3 5/26/2023   To state 1-2 tablets daily so that the pharmacy puts 180 tablets to line up with the rest of her medications. So when she comes into the [facility] to get prescription she has more than one med to .     Date of last appointment with provider: 06/01/23    Could we send this information to you in [employer] or would you prefer to receive a phone call?:   Patient would prefer a phone call   Okay to leave a detailed message?: Yes at Cell number on file:    Telephone Information:   Mobile [phone]

## 2023-11-29 NOTE — TELEPHONE ENCOUNTER
Spoke to patient Verónica and confirmed patient is taking 10 MG rosuvastatin daily.  Writer explained we are not able to alter the Rx to state she is taking an increased dosage when she is not  in order to get more medication at once.  Patient states she is not comfortable getting [place]  [place] the cities more often the next few moths and will be able to pick her prescriptions up on time.

## 2024-01-11 ENCOUNTER — OFFICE VISIT (OUTPATIENT)
Dept: INTERNAL MEDICINE | Facility: CLINIC | Age: 74
End: 2024-01-11
Payer: COMMERCIAL

## 2024-01-11 VITALS
DIASTOLIC BLOOD PRESSURE: 67 MMHG | BODY MASS INDEX: 24.33 KG/M2 | OXYGEN SATURATION: 97 % | WEIGHT: 155 LBS | TEMPERATURE: 98.3 F | HEART RATE: 75 BPM | HEIGHT: 67 IN | RESPIRATION RATE: 17 BRPM | SYSTOLIC BLOOD PRESSURE: 108 MMHG

## 2024-01-11 DIAGNOSIS — E78.5 HYPERLIPIDEMIA LDL GOAL <100: ICD-10-CM

## 2024-01-11 DIAGNOSIS — I50.22 CHRONIC SYSTOLIC HEART FAILURE (H): ICD-10-CM

## 2024-01-11 DIAGNOSIS — I42.9 CARDIOMYOPATHY, UNSPECIFIED TYPE (H): Primary | ICD-10-CM

## 2024-01-11 DIAGNOSIS — L72.3 SEBACEOUS CYST: ICD-10-CM

## 2024-01-11 DIAGNOSIS — F33.0 MILD RECURRENT MAJOR DEPRESSION (H): ICD-10-CM

## 2024-01-11 DIAGNOSIS — E55.9 VITAMIN D DEFICIENCY: ICD-10-CM

## 2024-01-11 LAB
BASOPHILS # BLD AUTO: 0 10E3/UL (ref 0–0.2)
BASOPHILS NFR BLD AUTO: 1 %
EOSINOPHIL # BLD AUTO: 0.2 10E3/UL (ref 0–0.7)
EOSINOPHIL NFR BLD AUTO: 3 %
ERYTHROCYTE [DISTWIDTH] IN BLOOD BY AUTOMATED COUNT: 12.6 % (ref 10–15)
HCT VFR BLD AUTO: 46.9 % (ref 35–47)
HGB BLD-MCNC: 15.1 G/DL (ref 11.7–15.7)
IMM GRANULOCYTES # BLD: 0 10E3/UL
IMM GRANULOCYTES NFR BLD: 0 %
LYMPHOCYTES # BLD AUTO: 1.7 10E3/UL (ref 0.8–5.3)
LYMPHOCYTES NFR BLD AUTO: 34 %
MCH RBC QN AUTO: 32.8 PG (ref 26.5–33)
MCHC RBC AUTO-ENTMCNC: 32.2 G/DL (ref 31.5–36.5)
MCV RBC AUTO: 102 FL (ref 78–100)
MONOCYTES # BLD AUTO: 0.4 10E3/UL (ref 0–1.3)
MONOCYTES NFR BLD AUTO: 8 %
NEUTROPHILS # BLD AUTO: 2.8 10E3/UL (ref 1.6–8.3)
NEUTROPHILS NFR BLD AUTO: 55 %
PLATELET # BLD AUTO: 255 10E3/UL (ref 150–450)
RBC # BLD AUTO: 4.61 10E6/UL (ref 3.8–5.2)
WBC # BLD AUTO: 5.1 10E3/UL (ref 4–11)

## 2024-01-11 PROCEDURE — 80061 LIPID PANEL: CPT | Performed by: INTERNAL MEDICINE

## 2024-01-11 PROCEDURE — 82306 VITAMIN D 25 HYDROXY: CPT | Performed by: INTERNAL MEDICINE

## 2024-01-11 PROCEDURE — 84443 ASSAY THYROID STIM HORMONE: CPT | Performed by: INTERNAL MEDICINE

## 2024-01-11 PROCEDURE — 85025 COMPLETE CBC W/AUTO DIFF WBC: CPT | Performed by: INTERNAL MEDICINE

## 2024-01-11 PROCEDURE — 99214 OFFICE O/P EST MOD 30 MIN: CPT | Performed by: INTERNAL MEDICINE

## 2024-01-11 PROCEDURE — 80053 COMPREHEN METABOLIC PANEL: CPT | Performed by: INTERNAL MEDICINE

## 2024-01-11 PROCEDURE — 36415 COLL VENOUS BLD VENIPUNCTURE: CPT | Performed by: INTERNAL MEDICINE

## 2024-01-11 RX ORDER — RESPIRATORY SYNCYTIAL VIRUS VACCINE 120MCG/0.5
0.5 KIT INTRAMUSCULAR ONCE
Qty: 1 EACH | Refills: 0 | Status: CANCELLED | OUTPATIENT
Start: 2024-01-11 | End: 2024-01-11

## 2024-01-11 ASSESSMENT — PATIENT HEALTH QUESTIONNAIRE - PHQ9
SUM OF ALL RESPONSES TO PHQ QUESTIONS 1-9: 5
10. IF YOU CHECKED OFF ANY PROBLEMS, HOW DIFFICULT HAVE THESE PROBLEMS MADE IT FOR YOU TO DO YOUR WORK, TAKE CARE OF THINGS AT HOME, OR GET ALONG WITH OTHER PEOPLE: SOMEWHAT DIFFICULT
SUM OF ALL RESPONSES TO PHQ QUESTIONS 1-9: 5

## 2024-01-11 ASSESSMENT — PAIN SCALES - GENERAL: PAINLEVEL: NO PAIN (0)

## 2024-01-11 NOTE — LETTER
January 16, 2024      Verónica Horn  [address]  [place] [address]        Dear Ms.Bookless Horn,    We are writing to inform you of your test results.    Verónica,  Labs look good!  Kidney,liver, thyroid functions, sugar, red cell counts are normal.  Cholesterol is excellent! Continue Crestor.  Vitamin D level is in good range.  Dr CHETAN Martinez Orders   Lipid panel reflex to direct LDL Fasting   Result Value Ref Range    Cholesterol 174 <200 mg/dL    Triglycerides 79 <150 mg/dL    Direct Measure HDL 78 >=50 mg/dL    LDL Cholesterol Calculated 80 <=100 mg/dL    Non HDL Cholesterol 96 <130 mg/dL    Patient Fasting > 8hrs? Unknown     Narrative    Cholesterol  Desirable:  <200 mg/dL    Triglycerides  Normal:  Less than 150 mg/dL  Borderline High:  150-199 mg/dL  High:  200-499 mg/dL  Very High:  Greater than or equal to 500 mg/dL    Direct Measure HDL  Female:  Greater than or equal to 50 mg/dL   Male:  Greater than or equal to 40 mg/dL    LDL Cholesterol  Desirable:  <100mg/dL  Above Desirable:  100-129 mg/dL   Borderline High:  130-159 mg/dL   High:  160-189 mg/dL   Very High:  >= 190 mg/dL    Non HDL Cholesterol  Desirable:  130 mg/dL  Above Desirable:  130-159 mg/dL  Borderline High:  160-189 mg/dL  High:  190-219 mg/dL  Very High:  Greater than or equal to 220 mg/dL   Vitamin D Deficiency   Result Value Ref Range    Vitamin D, Total (25-Hydroxy) 40 20 - 50 ng/mL      Comment:      optimum levels    Narrative    Season, race, dietary intake, and treatment affect the concentration of 25-hydroxy-Vitamin D. Values may decrease during winter months and increase during summer months.    Vitamin D determination is routinely performed by an immunoassay specific for 25 hydroxyvitamin D3.  If an individual is on vitamin D2(ergocalciferol) supplementation, please specify 25 OH vitamin D2 and D3 level determination by LCMSMS test VITD23.     Comprehensive metabolic panel   Result Value Ref Range    Sodium 142  135 - 145 mmol/L      Comment:      Reference intervals for this test were updated on 09/26/2023 to more accurately reflect our healthy population. There may be differences in the flagging of prior results with similar values performed with this method. Interpretation of those prior results can be made in the context of the updated reference intervals.     Potassium 4.7 3.4 - 5.3 mmol/L    Carbon Dioxide (CO2) 30 (H) 22 - 29 mmol/L    Anion Gap 8 7 - 15 mmol/L    Urea Nitrogen 15.7 8.0 - 23.0 mg/dL    Creatinine 0.82 0.51 - 0.95 mg/dL    GFR Estimate 76 >60 mL/min/1.73m2    Calcium 9.5 8.8 - 10.2 mg/dL    Chloride 104 98 - 107 mmol/L    Glucose 96 70 - 99 mg/dL    Alkaline Phosphatase 46 40 - 150 U/L      Comment:      Reference intervals for this test were updated on 11/14/2023 to more accurately reflect our healthy population. There may be differences in the flagging of prior results with similar values performed with this method. Interpretation of those prior results can be made in the context of the updated reference intervals.    AST 26 0 - 45 U/L      Comment:      Reference intervals for this test were updated on 6/12/2023 to more accurately reflect our healthy population. There may be differences in the flagging of prior results with similar values performed with this method. Interpretation of those prior results can be made in the context of the updated reference intervals.    ALT 18 0 - 50 U/L      Comment:      Reference intervals for this test were updated on 6/12/2023 to more accurately reflect our healthy population. There may be differences in the flagging of prior results with similar values performed with this method. Interpretation of those prior results can be made in the context of the updated reference intervals.      Protein Total 7.0 6.4 - 8.3 g/dL    Albumin 4.3 3.5 - 5.2 g/dL    Bilirubin Total 0.5 <=1.2 mg/dL   TSH with free T4 reflex   Result Value Ref Range    TSH 3.26 0.30 - 4.20 uIU/mL    CBC with platelets and differential   Result Value Ref Range    WBC Count 5.1 4.0 - 11.0 10e3/uL    RBC Count 4.61 3.80 - 5.20 10e6/uL    Hemoglobin 15.1 11.7 - 15.7 g/dL    Hematocrit 46.9 35.0 - 47.0 %     (H) 78 - 100 fL    MCH 32.8 26.5 - 33.0 pg    MCHC 32.2 31.5 - 36.5 g/dL    RDW 12.6 10.0 - 15.0 %    Platelet Count 255 150 - 450 10e3/uL    % Neutrophils 55 %    % Lymphocytes 34 %    % Monocytes 8 %    % Eosinophils 3 %    % Basophils 1 %    % Immature Granulocytes 0 %    Absolute Neutrophils 2.8 1.6 - 8.3 10e3/uL    Absolute Lymphocytes 1.7 0.8 - 5.3 10e3/uL    Absolute Monocytes 0.4 0.0 - 1.3 10e3/uL    Absolute Eosinophils 0.2 0.0 - 0.7 10e3/uL    Absolute Basophils 0.0 0.0 - 0.2 10e3/uL    Absolute Immature Granulocytes 0.0 <=0.4 10e3/uL       If you have any questions or concerns, please call the clinic at the number listed above.       Sincerely,      Angelina Evans MD

## 2024-01-11 NOTE — PATIENT INSTRUCTIONS
Labs today    2. Get Covid vaccine booster and RSV vaccine.     3. We can repeat heart US in may to see if heart function continues to improve. Continue medical treatment with metoprolol, aspirin and Crestor.    4. Skin lesion: sebaceous cyst, benign.
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
## 2024-01-12 LAB
ALBUMIN SERPL BCG-MCNC: 4.3 G/DL (ref 3.5–5.2)
ALP SERPL-CCNC: 46 U/L (ref 40–150)
ALT SERPL W P-5'-P-CCNC: 18 U/L (ref 0–50)
ANION GAP SERPL CALCULATED.3IONS-SCNC: 8 MMOL/L (ref 7–15)
AST SERPL W P-5'-P-CCNC: 26 U/L (ref 0–45)
BILIRUB SERPL-MCNC: 0.5 MG/DL
BUN SERPL-MCNC: 15.7 MG/DL (ref 8–23)
CALCIUM SERPL-MCNC: 9.5 MG/DL (ref 8.8–10.2)
CHLORIDE SERPL-SCNC: 104 MMOL/L (ref 98–107)
CHOLEST SERPL-MCNC: 174 MG/DL
CREAT SERPL-MCNC: 0.82 MG/DL (ref 0.51–0.95)
DEPRECATED HCO3 PLAS-SCNC: 30 MMOL/L (ref 22–29)
EGFRCR SERPLBLD CKD-EPI 2021: 76 ML/MIN/1.73M2
FASTING STATUS PATIENT QL REPORTED: NORMAL
GLUCOSE SERPL-MCNC: 96 MG/DL (ref 70–99)
HDLC SERPL-MCNC: 78 MG/DL
LDLC SERPL CALC-MCNC: 80 MG/DL
NONHDLC SERPL-MCNC: 96 MG/DL
POTASSIUM SERPL-SCNC: 4.7 MMOL/L (ref 3.4–5.3)
PROT SERPL-MCNC: 7 G/DL (ref 6.4–8.3)
SODIUM SERPL-SCNC: 142 MMOL/L (ref 135–145)
TRIGL SERPL-MCNC: 79 MG/DL
TSH SERPL DL<=0.005 MIU/L-ACNC: 3.26 UIU/ML (ref 0.3–4.2)
VIT D+METAB SERPL-MCNC: 40 NG/ML (ref 20–50)

## 2024-02-05 DIAGNOSIS — I50.20 HEART FAILURE WITH REDUCED EJECTION FRACTION, NYHA CLASS I (H): ICD-10-CM

## 2024-02-05 RX ORDER — METOPROLOL SUCCINATE 25 MG/1
25 TABLET, EXTENDED RELEASE ORAL DAILY
Qty: 90 TABLET | Refills: 0 | Status: SHIPPED | OUTPATIENT
Start: 2024-02-05

## 2024-03-05 ENCOUNTER — TRANSFERRED RECORDS (OUTPATIENT)
Dept: HEALTH INFORMATION MANAGEMENT | Facility: CLINIC | Age: 74
End: 2024-03-05
Payer: COMMERCIAL

## 2024-03-26 ENCOUNTER — PATIENT OUTREACH (OUTPATIENT)
Dept: CARE COORDINATION | Facility: CLINIC | Age: 74
End: 2024-03-26
Payer: COMMERCIAL

## 2024-04-11 ENCOUNTER — TRANSFERRED RECORDS (OUTPATIENT)
Dept: HEALTH INFORMATION MANAGEMENT | Facility: CLINIC | Age: 74
End: 2024-04-11
Payer: COMMERCIAL

## 2024-04-12 ENCOUNTER — MYC MEDICAL ADVICE (OUTPATIENT)
Dept: INTERNAL MEDICINE | Facility: CLINIC | Age: 74
End: 2024-04-12
Payer: COMMERCIAL

## 2024-04-12 DIAGNOSIS — N95.2 POSTMENOPAUSAL ATROPHIC VAGINITIS: ICD-10-CM

## 2024-04-12 DIAGNOSIS — M81.0 AGE-RELATED OSTEOPOROSIS WITHOUT CURRENT PATHOLOGICAL FRACTURE: ICD-10-CM

## 2024-04-12 DIAGNOSIS — M94.9 DISORDER OF BONE AND CARTILAGE: Primary | ICD-10-CM

## 2024-04-12 DIAGNOSIS — M89.9 DISORDER OF BONE AND CARTILAGE: Primary | ICD-10-CM

## 2024-04-16 ENCOUNTER — ANCILLARY PROCEDURE (OUTPATIENT)
Dept: MAMMOGRAPHY | Facility: CLINIC | Age: 74
End: 2024-04-16
Attending: INTERNAL MEDICINE
Payer: COMMERCIAL

## 2024-04-16 DIAGNOSIS — Z12.31 VISIT FOR SCREENING MAMMOGRAM: ICD-10-CM

## 2024-04-16 PROCEDURE — 77067 SCR MAMMO BI INCL CAD: CPT

## 2024-04-16 PROCEDURE — 77063 BREAST TOMOSYNTHESIS BI: CPT

## 2024-04-17 NOTE — TELEPHONE ENCOUNTER
Please see Pts my chart message. I put referral to [facility] in January, when pt called, they never received it. I always assumed that fax over happened automatically. Please fax over again ASAP.

## 2024-04-18 ENCOUNTER — TELEPHONE (OUTPATIENT)
Dept: INTERNAL MEDICINE | Facility: CLINIC | Age: 74
End: 2024-04-18

## 2024-04-18 ENCOUNTER — TELEPHONE (OUTPATIENT)
Dept: INTERNAL MEDICINE | Facility: CLINIC | Age: 74
End: 2024-04-18
Payer: COMMERCIAL

## 2024-04-18 NOTE — TELEPHONE ENCOUNTER
General Call      Reason for Call:  pt stating that her Insurance [facility], needs a referral for her bone density to which is being done on 04/19/2024    What are your questions or concerns:    Pt stating they need the referral today    Date of last appointment with provider: n/a    Could we send this information to you in [employer] or would you prefer to receive a phone call?:   Patient would prefer a phone call   Okay to leave a detailed message?: Yes at Cell number on file:    Telephone Information:   Mobile [phone]

## 2024-04-19 ENCOUNTER — ANCILLARY PROCEDURE (OUTPATIENT)
Dept: BONE DENSITY | Facility: CLINIC | Age: 74
End: 2024-04-19
Attending: INTERNAL MEDICINE
Payer: COMMERCIAL

## 2024-04-19 DIAGNOSIS — M89.9 DISORDER OF BONE AND CARTILAGE: ICD-10-CM

## 2024-04-19 DIAGNOSIS — M94.9 DISORDER OF BONE AND CARTILAGE: ICD-10-CM

## 2024-04-19 DIAGNOSIS — M81.0 AGE-RELATED OSTEOPOROSIS WITHOUT CURRENT PATHOLOGICAL FRACTURE: ICD-10-CM

## 2024-04-19 PROCEDURE — 77080 DXA BONE DENSITY AXIAL: CPT | Mod: TC | Performed by: RADIOLOGY

## 2024-04-19 NOTE — TELEPHONE ENCOUNTER
Patient's insurance does not require a referral and it's scheduled at [facility].  Could be a Prior Auth issue.    [facility] Referrals

## 2024-04-19 NOTE — TELEPHONE ENCOUNTER
Patient's insurance does not require a referral and they are both scheduled through [facility].  Could be a Prior Auth issue which Referrals does not handle.    Sarah  [facility] Referrals

## 2024-05-02 ENCOUNTER — PATIENT OUTREACH (OUTPATIENT)
Dept: CARE COORDINATION | Facility: CLINIC | Age: 74
End: 2024-05-02
Payer: COMMERCIAL

## 2024-05-07 ENCOUNTER — MEDICAL CORRESPONDENCE (OUTPATIENT)
Dept: HEALTH INFORMATION MANAGEMENT | Facility: CLINIC | Age: 74
End: 2024-05-07
Payer: COMMERCIAL

## 2024-05-07 ENCOUNTER — MYC MEDICAL ADVICE (OUTPATIENT)
Dept: INTERNAL MEDICINE | Facility: CLINIC | Age: 74
End: 2024-05-07
Payer: COMMERCIAL

## 2024-05-13 ENCOUNTER — LAB (OUTPATIENT)
Dept: LAB | Facility: CLINIC | Age: 74
End: 2024-05-13
Payer: COMMERCIAL

## 2024-05-13 DIAGNOSIS — M81.0 AGE-RELATED OSTEOPOROSIS WITHOUT CURRENT PATHOLOGICAL FRACTURE: ICD-10-CM

## 2024-05-13 LAB
ANION GAP SERPL CALCULATED.3IONS-SCNC: 9 MMOL/L (ref 7–15)
BUN SERPL-MCNC: 14.3 MG/DL (ref 8–23)
CALCIUM SERPL-MCNC: 9.2 MG/DL (ref 8.8–10.2)
CHLORIDE SERPL-SCNC: 106 MMOL/L (ref 98–107)
CREAT SERPL-MCNC: 0.87 MG/DL (ref 0.51–0.95)
DEPRECATED HCO3 PLAS-SCNC: 27 MMOL/L (ref 22–29)
EGFRCR SERPLBLD CKD-EPI 2021: 70 ML/MIN/1.73M2
GLUCOSE SERPL-MCNC: 109 MG/DL (ref 70–99)
POTASSIUM SERPL-SCNC: 4.5 MMOL/L (ref 3.4–5.3)
SODIUM SERPL-SCNC: 142 MMOL/L (ref 135–145)

## 2024-05-13 PROCEDURE — 36415 COLL VENOUS BLD VENIPUNCTURE: CPT

## 2024-05-13 PROCEDURE — 80048 BASIC METABOLIC PNL TOTAL CA: CPT

## 2024-05-16 ENCOUNTER — PATIENT OUTREACH (OUTPATIENT)
Dept: CARE COORDINATION | Facility: CLINIC | Age: 74
End: 2024-05-16
Payer: COMMERCIAL

## 2024-05-17 DIAGNOSIS — E78.5 HYPERLIPIDEMIA LDL GOAL <100: ICD-10-CM

## 2024-05-17 RX ORDER — ROSUVASTATIN CALCIUM 10 MG/1
10 TABLET, COATED ORAL DAILY
Qty: 90 TABLET | Refills: 1 | Status: SHIPPED | OUTPATIENT
Start: 2024-05-17 | End: 2024-08-07

## 2024-06-17 ENCOUNTER — TRANSFERRED RECORDS (OUTPATIENT)
Dept: HEALTH INFORMATION MANAGEMENT | Facility: CLINIC | Age: 74
End: 2024-06-17
Payer: COMMERCIAL

## 2024-08-07 DIAGNOSIS — E78.5 HYPERLIPIDEMIA LDL GOAL <100: ICD-10-CM

## 2024-08-07 RX ORDER — ROSUVASTATIN CALCIUM 10 MG/1
10 TABLET, COATED ORAL DAILY
Qty: 100 TABLET | Refills: 2 | Status: SHIPPED | OUTPATIENT
Start: 2024-08-07

## 2024-08-07 NOTE — TELEPHONE ENCOUNTER
Patient has [facility] coverage and with this insurance plan, the patient is eligible to receive certain prescriptions as a 100-day supply at the 90-day supply cost.      Prescriptions to be updated to 100-day supply: rosuvastatin    New prescription needed given insufficient refills remaining so pended for PCP review and signature.       Thank you!    Billie Arellano, PharmD, [facility]  Population Health Pharmacist  [phone]

## 2024-08-31 DIAGNOSIS — E78.5 HYPERLIPIDEMIA LDL GOAL <100: ICD-10-CM

## 2024-09-01 ENCOUNTER — HEALTH MAINTENANCE LETTER (OUTPATIENT)
Age: 74
End: 2024-09-01

## 2024-09-03 RX ORDER — ROSUVASTATIN CALCIUM 10 MG/1
10 TABLET, COATED ORAL DAILY
Qty: 90 TABLET | Refills: 1 | OUTPATIENT
Start: 2024-09-03

## 2024-11-11 ENCOUNTER — TELEPHONE (OUTPATIENT)
Dept: INTERNAL MEDICINE | Facility: CLINIC | Age: 74
End: 2024-11-11
Payer: COMMERCIAL

## 2024-11-11 DIAGNOSIS — E78.5 HYPERLIPIDEMIA LDL GOAL <100: Primary | ICD-10-CM

## 2024-11-11 DIAGNOSIS — E55.9 VITAMIN D DEFICIENCY: ICD-10-CM

## 2024-11-11 DIAGNOSIS — I42.9 CARDIOMYOPATHY, UNSPECIFIED TYPE (H): ICD-10-CM

## 2024-11-11 NOTE — TELEPHONE ENCOUNTER
Order/Referral Request    Who is requesting: Patient    Orders being requested: Lab orders for annual physical     Reason service is needed/diagnosis: na    When are orders needed by: Patient would like to come in before her appointment to get lab work done    Has this been discussed with Provider: No    Does patient have a preference on a Group/Provider/Facility? na    Does patient have an appointment scheduled?: Yes: 11/27    Where to send orders: Place orders within Epic    Could we send this information to you in [employer] [place] or would you prefer to receive a phone call?:   Patient would prefer a phone call   Okay to leave a detailed message?: no voicemail at Cell number on file:    Telephone Information:   Mobile [phone]

## 2024-11-14 NOTE — TELEPHONE ENCOUNTER
Sent myc message to call and schedule fasting lab, or call and let us know if she is planning on having them done at time of awv, 11/27/24.

## 2024-11-20 ENCOUNTER — MYC MEDICAL ADVICE (OUTPATIENT)
Dept: INTERNAL MEDICINE | Facility: CLINIC | Age: 74
End: 2024-11-20
Payer: COMMERCIAL

## 2024-11-27 ENCOUNTER — OFFICE VISIT (OUTPATIENT)
Dept: INTERNAL MEDICINE | Facility: CLINIC | Age: 74
End: 2024-11-27
Payer: COMMERCIAL

## 2024-11-27 ENCOUNTER — ANCILLARY PROCEDURE (OUTPATIENT)
Dept: GENERAL RADIOLOGY | Facility: CLINIC | Age: 74
End: 2024-11-27
Attending: INTERNAL MEDICINE
Payer: COMMERCIAL

## 2024-11-27 VITALS
WEIGHT: 163.3 LBS | RESPIRATION RATE: 12 BRPM | HEART RATE: 88 BPM | DIASTOLIC BLOOD PRESSURE: 75 MMHG | SYSTOLIC BLOOD PRESSURE: 120 MMHG | BODY MASS INDEX: 25.63 KG/M2 | OXYGEN SATURATION: 98 % | HEIGHT: 67 IN | TEMPERATURE: 99.1 F

## 2024-11-27 DIAGNOSIS — I50.20 HEART FAILURE WITH REDUCED EJECTION FRACTION, NYHA CLASS I (H): ICD-10-CM

## 2024-11-27 DIAGNOSIS — M54.41 CHRONIC MIDLINE LOW BACK PAIN WITH RIGHT-SIDED SCIATICA: ICD-10-CM

## 2024-11-27 DIAGNOSIS — M25.551 HIP PAIN, RIGHT: ICD-10-CM

## 2024-11-27 DIAGNOSIS — F33.1 MODERATE EPISODE OF RECURRENT MAJOR DEPRESSIVE DISORDER (H): ICD-10-CM

## 2024-11-27 DIAGNOSIS — Z12.31 ENCOUNTER FOR SCREENING MAMMOGRAM FOR BREAST CANCER: ICD-10-CM

## 2024-11-27 DIAGNOSIS — I42.9 CARDIOMYOPATHY, UNSPECIFIED TYPE (H): ICD-10-CM

## 2024-11-27 DIAGNOSIS — E78.5 HYPERLIPIDEMIA LDL GOAL <100: ICD-10-CM

## 2024-11-27 DIAGNOSIS — M25.552 HIP PAIN, LEFT: ICD-10-CM

## 2024-11-27 DIAGNOSIS — Z00.00 ENCOUNTER FOR ANNUAL WELLNESS EXAM IN MEDICARE PATIENT: Primary | ICD-10-CM

## 2024-11-27 DIAGNOSIS — H01.009 BLEPHARITIS, UNSPECIFIED LATERALITY, UNSPECIFIED TYPE: ICD-10-CM

## 2024-11-27 DIAGNOSIS — G89.29 CHRONIC MIDLINE LOW BACK PAIN WITH RIGHT-SIDED SCIATICA: ICD-10-CM

## 2024-11-27 DIAGNOSIS — M81.0 AGE-RELATED OSTEOPOROSIS WITHOUT CURRENT PATHOLOGICAL FRACTURE: ICD-10-CM

## 2024-11-27 DIAGNOSIS — M16.10 HIP ARTHRITIS: ICD-10-CM

## 2024-11-27 DIAGNOSIS — E55.9 VITAMIN D DEFICIENCY: ICD-10-CM

## 2024-11-27 DIAGNOSIS — Z12.11 COLON CANCER SCREENING: ICD-10-CM

## 2024-11-27 LAB
ALBUMIN SERPL BCG-MCNC: 4.4 G/DL (ref 3.5–5.2)
ALP SERPL-CCNC: 48 U/L (ref 40–150)
ALT SERPL W P-5'-P-CCNC: 14 U/L (ref 0–50)
ANION GAP SERPL CALCULATED.3IONS-SCNC: 10 MMOL/L (ref 7–15)
AST SERPL W P-5'-P-CCNC: 28 U/L (ref 0–45)
BASOPHILS # BLD AUTO: 0 10E3/UL (ref 0–0.2)
BASOPHILS NFR BLD AUTO: 0 %
BILIRUB SERPL-MCNC: 0.4 MG/DL
BUN SERPL-MCNC: 15.9 MG/DL (ref 8–23)
CALCIUM SERPL-MCNC: 10 MG/DL (ref 8.8–10.4)
CHLORIDE SERPL-SCNC: 103 MMOL/L (ref 98–107)
CHOLEST SERPL-MCNC: 179 MG/DL
CREAT SERPL-MCNC: 0.81 MG/DL (ref 0.51–0.95)
EGFRCR SERPLBLD CKD-EPI 2021: 76 ML/MIN/1.73M2
EOSINOPHIL # BLD AUTO: 0.1 10E3/UL (ref 0–0.7)
EOSINOPHIL NFR BLD AUTO: 1 %
ERYTHROCYTE [DISTWIDTH] IN BLOOD BY AUTOMATED COUNT: 12.5 % (ref 10–15)
FASTING STATUS PATIENT QL REPORTED: ABNORMAL
FASTING STATUS PATIENT QL REPORTED: NORMAL
GLUCOSE SERPL-MCNC: 100 MG/DL (ref 70–99)
HCO3 SERPL-SCNC: 26 MMOL/L (ref 22–29)
HCT VFR BLD AUTO: 49.5 % (ref 35–47)
HDLC SERPL-MCNC: 83 MG/DL
HGB BLD-MCNC: 16.2 G/DL (ref 11.7–15.7)
IMM GRANULOCYTES # BLD: 0 10E3/UL
IMM GRANULOCYTES NFR BLD: 0 %
LDLC SERPL CALC-MCNC: 78 MG/DL
LYMPHOCYTES # BLD AUTO: 2.1 10E3/UL (ref 0.8–5.3)
LYMPHOCYTES NFR BLD AUTO: 30 %
MCH RBC QN AUTO: 32.3 PG (ref 26.5–33)
MCHC RBC AUTO-ENTMCNC: 32.7 G/DL (ref 31.5–36.5)
MCV RBC AUTO: 99 FL (ref 78–100)
MONOCYTES # BLD AUTO: 0.4 10E3/UL (ref 0–1.3)
MONOCYTES NFR BLD AUTO: 6 %
NEUTROPHILS # BLD AUTO: 4.3 10E3/UL (ref 1.6–8.3)
NEUTROPHILS NFR BLD AUTO: 62 %
NONHDLC SERPL-MCNC: 96 MG/DL
PLATELET # BLD AUTO: 284 10E3/UL (ref 150–450)
POTASSIUM SERPL-SCNC: 4.5 MMOL/L (ref 3.4–5.3)
PROT SERPL-MCNC: 7.5 G/DL (ref 6.4–8.3)
RBC # BLD AUTO: 5.01 10E6/UL (ref 3.8–5.2)
SODIUM SERPL-SCNC: 139 MMOL/L (ref 135–145)
TRIGL SERPL-MCNC: 88 MG/DL
TSH SERPL DL<=0.005 MIU/L-ACNC: 3.57 UIU/ML (ref 0.3–4.2)
VIT D+METAB SERPL-MCNC: 39 NG/ML (ref 20–50)
WBC # BLD AUTO: 7 10E3/UL (ref 4–11)

## 2024-11-27 PROCEDURE — 72100 X-RAY EXAM L-S SPINE 2/3 VWS: CPT | Mod: TC | Performed by: RADIOLOGY

## 2024-11-27 PROCEDURE — 82306 VITAMIN D 25 HYDROXY: CPT | Performed by: INTERNAL MEDICINE

## 2024-11-27 PROCEDURE — 80061 LIPID PANEL: CPT | Performed by: INTERNAL MEDICINE

## 2024-11-27 PROCEDURE — 84443 ASSAY THYROID STIM HORMONE: CPT | Performed by: INTERNAL MEDICINE

## 2024-11-27 PROCEDURE — 36415 COLL VENOUS BLD VENIPUNCTURE: CPT | Performed by: INTERNAL MEDICINE

## 2024-11-27 PROCEDURE — 80053 COMPREHEN METABOLIC PANEL: CPT | Performed by: INTERNAL MEDICINE

## 2024-11-27 PROCEDURE — 85025 COMPLETE CBC W/AUTO DIFF WBC: CPT | Performed by: INTERNAL MEDICINE

## 2024-11-27 RX ORDER — METOPROLOL SUCCINATE 25 MG/1
25 TABLET, EXTENDED RELEASE ORAL DAILY
Qty: 90 TABLET | Refills: 3 | Status: SHIPPED | OUTPATIENT
Start: 2024-11-27

## 2024-11-27 RX ORDER — DOXYCYCLINE 100 MG/1
100 CAPSULE ORAL DAILY
Qty: 90 CAPSULE | Refills: 0 | Status: SHIPPED | OUTPATIENT
Start: 2024-11-27

## 2024-11-27 SDOH — HEALTH STABILITY: PHYSICAL HEALTH: ON AVERAGE, HOW MANY DAYS PER WEEK DO YOU ENGAGE IN MODERATE TO STRENUOUS EXERCISE (LIKE A BRISK WALK)?: 7 DAYS

## 2024-11-27 SDOH — HEALTH STABILITY: PHYSICAL HEALTH: ON AVERAGE, HOW MANY MINUTES DO YOU ENGAGE IN EXERCISE AT THIS LEVEL?: 20 MIN

## 2024-11-27 ASSESSMENT — SOCIAL DETERMINANTS OF HEALTH (SDOH): HOW OFTEN DO YOU GET TOGETHER WITH FRIENDS OR RELATIVES?: NEVER

## 2024-11-27 ASSESSMENT — PAIN SCALES - GENERAL: PAINLEVEL_OUTOF10: MILD PAIN (2)

## 2024-11-27 NOTE — PATIENT INSTRUCTIONS
Right hip XR today    2. Labs today    3. I would not stop 2 antidepressants at once, O'K to hold Duloxetine , continue Wellbutrin.    4. Schedule colonoscopy    5. Add fiber, Psyllium powder with a large glass of water daily to help with constipation.
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
## 2024-12-02 ENCOUNTER — PATIENT OUTREACH (OUTPATIENT)
Dept: CARE COORDINATION | Facility: CLINIC | Age: 74
End: 2024-12-02
Payer: COMMERCIAL

## 2024-12-18 ENCOUNTER — MYC MEDICAL ADVICE (OUTPATIENT)
Dept: INTERNAL MEDICINE | Facility: CLINIC | Age: 74
End: 2024-12-18
Payer: COMMERCIAL

## 2025-04-01 ENCOUNTER — TELEPHONE (OUTPATIENT)
Dept: INTERNAL MEDICINE | Facility: CLINIC | Age: 75
End: 2025-04-01
Payer: COMMERCIAL

## 2025-04-01 NOTE — TELEPHONE ENCOUNTER
Pt calling with Vaccine questions:    1) would like to have RSV done at [place] pharmacy, please send in order.     2) wondering if she should get a second COVID booster?      Pt would like MyChart response when provider returns.

## 2025-04-05 NOTE — TELEPHONE ENCOUNTER
Please let pt know,  RSV is a seasonal virus September through April, I would recommend getting vaccinate for it in the fall.  Her next covid booster is due May 27th, 6 months from her last Covid shot.

## 2025-04-15 DIAGNOSIS — H01.009 BLEPHARITIS, UNSPECIFIED LATERALITY, UNSPECIFIED TYPE: ICD-10-CM

## 2025-04-15 DIAGNOSIS — R09.81 SINUS CONGESTION: ICD-10-CM

## 2025-04-23 ENCOUNTER — TELEPHONE (OUTPATIENT)
Dept: INTERNAL MEDICINE | Facility: CLINIC | Age: 75
End: 2025-04-23
Payer: COMMERCIAL

## 2025-04-23 RX ORDER — DOXYCYCLINE 100 MG/1
100 CAPSULE ORAL DAILY
Qty: 90 CAPSULE | Refills: 3 | Status: SHIPPED | OUTPATIENT
Start: 2025-04-23

## 2025-04-23 RX ORDER — AZELASTINE HYDROCHLORIDE 137 UG/1
SPRAY, METERED NASAL
Qty: 90 ML | Refills: 11 | Status: SHIPPED | OUTPATIENT
Start: 2025-04-23

## 2025-04-23 RX ORDER — FLUTICASONE PROPIONATE 50 MCG
SPRAY, SUSPENSION (ML) NASAL
Qty: 16 G | Refills: 11 | Status: SHIPPED | OUTPATIENT
Start: 2025-04-23

## 2025-04-23 NOTE — TELEPHONE ENCOUNTER
Spoke with patient regarding separate refill encounter.     Patient reports about concerns with hip surgery (not scheduled). States insurance will not cover an over night stay. She is concerned to go home after anesthesia and having to navigate walking on her home. States both her home and cabin have stairs she would need to climb. Informed patient that Dr. Evans would be asked if any input on this.     EDGARDO Melvin   [facility]

## 2025-04-23 NOTE — TELEPHONE ENCOUNTER
It's hard  to tell how she will do do after hip surgery, sometimes short TCU stay is required for reabilitation, sometimes not. Ideally, it would be best to have everything ( kitchen, bathroom) on one level . See if family can come and stay with her post op for 1-2 weeks to help.

## 2025-04-23 NOTE — TELEPHONE ENCOUNTER
"Spoke with patient, confirms she is taking doxycycline daily. Reports if she does not take, her rosacea and blepharitis flares up. Informed patient request for refills would be sent to PCP.     Discussed request for nasal sprays. Patient unsure which one she has taken in the past. States it helps her hear better. Flonase listed on medication list from 9/27/24. Patient cannot confirm if this is the nasal spray she used, states as long as it is not expensive. Patient reports \"It helps me hear better\".       EDGARDO Melvin   [facility]     "

## 2025-05-20 ENCOUNTER — OFFICE VISIT (OUTPATIENT)
Dept: CARDIOLOGY | Facility: CLINIC | Age: 75
End: 2025-05-20
Payer: COMMERCIAL

## 2025-05-20 VITALS
SYSTOLIC BLOOD PRESSURE: 112 MMHG | WEIGHT: 166.8 LBS | BODY MASS INDEX: 26.18 KG/M2 | HEIGHT: 67 IN | RESPIRATION RATE: 16 BRPM | DIASTOLIC BLOOD PRESSURE: 62 MMHG | HEART RATE: 82 BPM

## 2025-05-20 DIAGNOSIS — I50.20 HEART FAILURE WITH REDUCED EJECTION FRACTION, NYHA CLASS I (H): Primary | ICD-10-CM

## 2025-05-20 PROCEDURE — G2211 COMPLEX E/M VISIT ADD ON: HCPCS | Performed by: INTERNAL MEDICINE

## 2025-05-20 PROCEDURE — 99214 OFFICE O/P EST MOD 30 MIN: CPT | Performed by: INTERNAL MEDICINE

## 2025-05-20 PROCEDURE — 3078F DIAST BP <80 MM HG: CPT | Performed by: INTERNAL MEDICINE

## 2025-05-20 PROCEDURE — 3074F SYST BP LT 130 MM HG: CPT | Performed by: INTERNAL MEDICINE
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
## 2025-06-21 ENCOUNTER — HEALTH MAINTENANCE LETTER (OUTPATIENT)
Age: 75
End: 2025-06-21

## 2025-07-25 ENCOUNTER — MYC MEDICAL ADVICE (OUTPATIENT)
Dept: INTERNAL MEDICINE | Facility: CLINIC | Age: 75
End: 2025-07-25
Payer: COMMERCIAL

## 2025-07-25 ENCOUNTER — TELEPHONE (OUTPATIENT)
Dept: INTERNAL MEDICINE | Facility: CLINIC | Age: 75
End: 2025-07-25
Payer: COMMERCIAL

## 2025-07-25 NOTE — TELEPHONE ENCOUNTER
Patient Returning Call    Reason for call:  You like a call back to discuss what virus is going around. Sick, tight chest, took OTC Meds (Theraflu) for a few days and is not working, wants to discuss a prescription for her symptoms.     Information relayed to patient:  N/A    Patient has additional questions:  Yes    What are your questions/concerns:  Not in sinuses, in chest.    Who does the patient want to speak with:  RN    Is an  needed?:  No      Could we send this information to you in [facility] or would you prefer to receive a phone call?:   Patient would prefer a phone call - try to call twice she has DND on because of Spam calls.  Okay to leave a detailed message?: No at Cell number on file:    Telephone Information:   Mobile [phone] - No VM - Call twice

## 2025-07-28 NOTE — TELEPHONE ENCOUNTER
"Spoke to pt.  She is still in [place].  Pt reports symptoms are improving.  Week ago she developed \"sore throat, tight chest and aches and pains\".   Reports she was taking TheraFlu and took some Robitussin this morning which as helped. States she is feeling a little tired and still congested.  No fevers at this time. Denies any difficulty breathing. Reports she is able sleep well.  Advised pt to continue supportive care and seek evaluation if symptoms worsen.  Pt agreeable to plan.  Pt pleasant and thanked writer for call.       See alternate MyChart encounters.   "

## 2025-08-06 DIAGNOSIS — E78.5 HYPERLIPIDEMIA LDL GOAL <100: ICD-10-CM

## 2025-08-06 RX ORDER — ROSUVASTATIN CALCIUM 10 MG/1
10 TABLET, COATED ORAL DAILY
Qty: 100 TABLET | Refills: 0 | Status: SHIPPED | OUTPATIENT
Start: 2025-08-06